# Patient Record
Sex: FEMALE | Race: OTHER | Employment: UNEMPLOYED | URBAN - METROPOLITAN AREA
[De-identification: names, ages, dates, MRNs, and addresses within clinical notes are randomized per-mention and may not be internally consistent; named-entity substitution may affect disease eponyms.]

---

## 2018-05-22 ENCOUNTER — HOSPITAL ENCOUNTER (INPATIENT)
Age: 33
LOS: 1 days | Discharge: HOME | DRG: 419 | End: 2018-05-23
Attending: EMERGENCY MEDICINE | Admitting: SURGERY
Payer: SELF-PAY

## 2018-05-22 ENCOUNTER — APPOINTMENT (OUTPATIENT)
Dept: ULTRASOUND IMAGING | Age: 33
DRG: 419 | End: 2018-05-22
Attending: EMERGENCY MEDICINE
Payer: SELF-PAY

## 2018-05-22 DIAGNOSIS — K81.9 ACALCULOUS CHOLECYSTITIS: Primary | ICD-10-CM

## 2018-05-22 DIAGNOSIS — D72.829 LEUKOCYTOSIS, UNSPECIFIED TYPE: ICD-10-CM

## 2018-05-22 DIAGNOSIS — K81.0 ACUTE CHOLECYSTITIS: ICD-10-CM

## 2018-05-22 LAB
ALBUMIN SERPL-MCNC: 2.7 G/DL (ref 3.5–5)
ALBUMIN/GLOB SERPL: 0.6 {RATIO} (ref 1.2–3.5)
ALP SERPL-CCNC: 206 U/L (ref 50–136)
ALT SERPL-CCNC: 23 U/L (ref 12–65)
ANION GAP SERPL CALC-SCNC: 10 MMOL/L (ref 7–16)
AST SERPL-CCNC: 25 U/L (ref 15–37)
BACTERIA URNS QL MICRO: ABNORMAL /HPF
BASOPHILS # BLD: 0 K/UL (ref 0–0.2)
BASOPHILS NFR BLD: 0 % (ref 0–2)
BILIRUB SERPL-MCNC: 2.5 MG/DL (ref 0.2–1.1)
BUN SERPL-MCNC: 40 MG/DL (ref 6–23)
CALCIUM SERPL-MCNC: 8.8 MG/DL (ref 8.3–10.4)
CASTS URNS QL MICRO: ABNORMAL /LPF
CHLORIDE SERPL-SCNC: 105 MMOL/L (ref 98–107)
CO2 SERPL-SCNC: 24 MMOL/L (ref 21–32)
CREAT SERPL-MCNC: 1.67 MG/DL (ref 0.6–1)
CRYSTALS URNS QL MICRO: 0 /LPF
DIFFERENTIAL METHOD BLD: ABNORMAL
EOSINOPHIL # BLD: 0 K/UL (ref 0–0.8)
EOSINOPHIL NFR BLD: 0 % (ref 0.5–7.8)
EPI CELLS #/AREA URNS HPF: ABNORMAL /HPF
ERYTHROCYTE [DISTWIDTH] IN BLOOD BY AUTOMATED COUNT: 13.9 % (ref 11.9–14.6)
FLUAV AG NPH QL IA: NEGATIVE
FLUBV AG NPH QL IA: NEGATIVE
GLOBULIN SER CALC-MCNC: 4.8 G/DL (ref 2.3–3.5)
GLUCOSE SERPL-MCNC: 106 MG/DL (ref 65–100)
HCG UR QL: NEGATIVE
HCT VFR BLD AUTO: 31.5 % (ref 35.8–46.3)
HGB BLD-MCNC: 11.4 G/DL (ref 11.7–15.4)
IMM GRANULOCYTES # BLD: 0.1 K/UL (ref 0–0.5)
IMM GRANULOCYTES NFR BLD AUTO: 1 % (ref 0–5)
LACTATE BLD-SCNC: 1.5 MMOL/L (ref 0.5–1.9)
LIPASE SERPL-CCNC: 126 U/L (ref 73–393)
LYMPHOCYTES # BLD: 0.9 K/UL (ref 0.5–4.6)
LYMPHOCYTES NFR BLD: 5 % (ref 13–44)
MCH RBC QN AUTO: 31.6 PG (ref 26.1–32.9)
MCHC RBC AUTO-ENTMCNC: 36.2 G/DL (ref 31.4–35)
MCV RBC AUTO: 87.3 FL (ref 79.6–97.8)
MONOCYTES # BLD: 0.9 K/UL (ref 0.1–1.3)
MONOCYTES NFR BLD: 5 % (ref 4–12)
MUCOUS THREADS URNS QL MICRO: 0 /LPF
NEUTS SEG # BLD: 16.4 K/UL (ref 1.7–8.2)
NEUTS SEG NFR BLD: 89 % (ref 43–78)
OTHER OBSERVATIONS,UCOM: ABNORMAL
PLATELET # BLD AUTO: 235 K/UL (ref 150–450)
PMV BLD AUTO: 10.5 FL (ref 10.8–14.1)
POTASSIUM SERPL-SCNC: 3.2 MMOL/L (ref 3.5–5.1)
PROT SERPL-MCNC: 7.5 G/DL (ref 6.3–8.2)
RBC # BLD AUTO: 3.61 M/UL (ref 4.05–5.25)
RBC #/AREA URNS HPF: ABNORMAL /HPF
SODIUM SERPL-SCNC: 139 MMOL/L (ref 136–145)
WBC # BLD AUTO: 18.3 K/UL (ref 4.3–11.1)
WBC URNS QL MICRO: >100 /HPF
YEAST URNS QL MICRO: ABNORMAL

## 2018-05-22 PROCEDURE — 65270000029 HC RM PRIVATE

## 2018-05-22 PROCEDURE — 96375 TX/PRO/DX INJ NEW DRUG ADDON: CPT | Performed by: EMERGENCY MEDICINE

## 2018-05-22 PROCEDURE — 76705 ECHO EXAM OF ABDOMEN: CPT

## 2018-05-22 PROCEDURE — 77030032490 HC SLV COMPR SCD KNE COVD -B

## 2018-05-22 PROCEDURE — 0FT44ZZ RESECTION OF GALLBLADDER, PERCUTANEOUS ENDOSCOPIC APPROACH: ICD-10-PCS | Performed by: SURGERY

## 2018-05-22 PROCEDURE — 80053 COMPREHEN METABOLIC PANEL: CPT | Performed by: EMERGENCY MEDICINE

## 2018-05-22 PROCEDURE — 87804 INFLUENZA ASSAY W/OPTIC: CPT | Performed by: EMERGENCY MEDICINE

## 2018-05-22 PROCEDURE — 93005 ELECTROCARDIOGRAM TRACING: CPT | Performed by: EMERGENCY MEDICINE

## 2018-05-22 PROCEDURE — 81025 URINE PREGNANCY TEST: CPT

## 2018-05-22 PROCEDURE — 83605 ASSAY OF LACTIC ACID: CPT

## 2018-05-22 PROCEDURE — 81015 MICROSCOPIC EXAM OF URINE: CPT | Performed by: EMERGENCY MEDICINE

## 2018-05-22 PROCEDURE — 74011000250 HC RX REV CODE- 250: Performed by: EMERGENCY MEDICINE

## 2018-05-22 PROCEDURE — 96365 THER/PROPH/DIAG IV INF INIT: CPT | Performed by: EMERGENCY MEDICINE

## 2018-05-22 PROCEDURE — 77030027138 HC INCENT SPIROMETER -A

## 2018-05-22 PROCEDURE — 81003 URINALYSIS AUTO W/O SCOPE: CPT | Performed by: EMERGENCY MEDICINE

## 2018-05-22 PROCEDURE — 96361 HYDRATE IV INFUSION ADD-ON: CPT | Performed by: EMERGENCY MEDICINE

## 2018-05-22 PROCEDURE — 74011250636 HC RX REV CODE- 250/636: Performed by: SURGERY

## 2018-05-22 PROCEDURE — 74011250636 HC RX REV CODE- 250/636: Performed by: EMERGENCY MEDICINE

## 2018-05-22 PROCEDURE — 83690 ASSAY OF LIPASE: CPT | Performed by: EMERGENCY MEDICINE

## 2018-05-22 PROCEDURE — 85025 COMPLETE CBC W/AUTO DIFF WBC: CPT | Performed by: EMERGENCY MEDICINE

## 2018-05-22 PROCEDURE — 99285 EMERGENCY DEPT VISIT HI MDM: CPT | Performed by: EMERGENCY MEDICINE

## 2018-05-22 PROCEDURE — 74011000258 HC RX REV CODE- 258: Performed by: EMERGENCY MEDICINE

## 2018-05-22 RX ORDER — SODIUM CHLORIDE 9 MG/ML
125 INJECTION, SOLUTION INTRAVENOUS CONTINUOUS
Status: DISCONTINUED | OUTPATIENT
Start: 2018-05-22 | End: 2018-05-23 | Stop reason: HOSPADM

## 2018-05-22 RX ORDER — HYDROMORPHONE HYDROCHLORIDE 1 MG/ML
1 INJECTION, SOLUTION INTRAMUSCULAR; INTRAVENOUS; SUBCUTANEOUS ONCE
Status: COMPLETED | OUTPATIENT
Start: 2018-05-22 | End: 2018-05-22

## 2018-05-22 RX ORDER — DICLOFENAC POTASSIUM 50 MG/1
50 TABLET, FILM COATED ORAL 3 TIMES DAILY
COMMUNITY

## 2018-05-22 RX ORDER — HYDROMORPHONE HYDROCHLORIDE 1 MG/ML
1 INJECTION, SOLUTION INTRAMUSCULAR; INTRAVENOUS; SUBCUTANEOUS
Status: DISCONTINUED | OUTPATIENT
Start: 2018-05-22 | End: 2018-05-23 | Stop reason: HOSPADM

## 2018-05-22 RX ORDER — SODIUM CHLORIDE 9 MG/ML
150 INJECTION, SOLUTION INTRAVENOUS CONTINUOUS
Status: DISCONTINUED | OUTPATIENT
Start: 2018-05-22 | End: 2018-05-23 | Stop reason: HOSPADM

## 2018-05-22 RX ORDER — ONDANSETRON 2 MG/ML
4 INJECTION INTRAMUSCULAR; INTRAVENOUS
Status: DISCONTINUED | OUTPATIENT
Start: 2018-05-22 | End: 2018-05-23 | Stop reason: HOSPADM

## 2018-05-22 RX ORDER — HYDROMORPHONE HCL IN 0.9% NACL 0.5 MG/ML
1 SYRINGE (ML) INTRAVENOUS
Status: DISCONTINUED | OUTPATIENT
Start: 2018-05-22 | End: 2018-05-22 | Stop reason: SDUPTHER

## 2018-05-22 RX ADMIN — SODIUM CHLORIDE 25 MG: 9 INJECTION INTRAMUSCULAR; INTRAVENOUS; SUBCUTANEOUS at 20:12

## 2018-05-22 RX ADMIN — PIPERACILLIN SODIUM,TAZOBACTAM SODIUM 3.38 G: 3; .375 INJECTION, POWDER, FOR SOLUTION INTRAVENOUS at 21:54

## 2018-05-22 RX ADMIN — HYDROMORPHONE HYDROCHLORIDE 1 MG: 1 INJECTION, SOLUTION INTRAMUSCULAR; INTRAVENOUS; SUBCUTANEOUS at 20:12

## 2018-05-22 RX ADMIN — SODIUM CHLORIDE 1000 ML: 900 INJECTION, SOLUTION INTRAVENOUS at 21:32

## 2018-05-22 RX ADMIN — SODIUM CHLORIDE 1000 ML: 900 INJECTION, SOLUTION INTRAVENOUS at 20:12

## 2018-05-22 RX ADMIN — SODIUM CHLORIDE 125 ML/HR: 900 INJECTION, SOLUTION INTRAVENOUS at 23:32

## 2018-05-22 NOTE — ED TRIAGE NOTES
used with [employer] phone. [id number]. Pt states whole body hurts. Has been hurting since Friday. Pain started in the back, and is now causing hard to breathe since there is pain in the back and the abdomen. Nausea and vomitting since yesterday.

## 2018-05-22 NOTE — ED PROVIDER NOTES
HPI Comments: Patient started having mid back pain about 5 days ago. She also started having chills and a decreased appetite. She denies a sore throat or cough. She states anytime she eats, she vomits. The past couple days she has had some upper abdominal pain as well. She denies any aggravating or relieving factors, denies similar pain in the past.  She has been taking diclofenac for her fever and chills with some slight improvement. Elements of this note were made using speech recognition software. As such, errors of speech recognition may occur. Patient is a 34 y.o. female presenting with general illness. The history is provided by the patient. The history is limited by a language barrier. A  was used. Generalized Body Aches   Associated symptoms include abdominal pain. History reviewed. No pertinent past medical history. History reviewed. No pertinent surgical history. History reviewed. No pertinent family history. Social History     Social History    Marital status:      Spouse name: N/A    Number of children: N/A    Years of education: N/A     Occupational History    Not on file. Social History Main Topics    Smoking status: Never Smoker    Smokeless tobacco: Never Used    Alcohol use No    Drug use: No    Sexual activity: Not on file     Other Topics Concern    Not on file     Social History Narrative    No narrative on file         ALLERGIES: Review of patient's allergies indicates no known allergies. Review of Systems   Constitutional: Positive for chills and fever. Gastrointestinal: Positive for abdominal pain, nausea and vomiting. Negative for constipation and diarrhea. All other systems reviewed and are negative.       Vitals:    05/22/18 1914 05/22/18 1940 05/22/18 1941   BP: (!) 88/56  106/56   Pulse: (!) 115  99   Resp: 18 22   Temp: 99.2 °F (37.3 °C)     SpO2: 97% 98% 98%            Physical Exam   Constitutional: She is oriented to person, place, and time. She appears well-developed and well-nourished. His pain a female who appears her stated age lying on the stretcher who appears uncomfortable   HENT:   Head: Normocephalic and atraumatic. Eyes: Conjunctivae are normal. Pupils are equal, round, and reactive to light. Neck: Normal range of motion. Neck supple. Cardiovascular: Normal rate and regular rhythm. Pulmonary/Chest: Effort normal and breath sounds normal.   Abdominal: Soft. Bowel sounds are normal. There is tenderness. Tenderness to palpation upper abdomen as indicated   Musculoskeletal: She exhibits no edema or tenderness. Neurological: She is alert and oriented to person, place, and time. Skin: Skin is warm and dry. Psychiatric: She has a normal mood and affect. Her behavior is normal.   Nursing note and vitals reviewed. MDM  Number of Diagnoses or Management Options  Acalculous cholecystitis: new and requires workup  Leukocytosis, unspecified type:   Diagnosis management comments: 8:03 PM concerned about cholecystitis, will get ultrasound  9:09 PM US shows acute cholecystitis.   Discussed results with patient, General surgery has been paged  9:32 PM spoke with Dr. Joseph Mcclellan, once patient placed on Zosyn for her UTI       Amount and/or Complexity of Data Reviewed  Clinical lab tests: ordered and reviewed  Tests in the radiology section of CPT®: ordered and reviewed  Discuss the patient with other providers: yes  Independent visualization of images, tracings, or specimens: yes    Risk of Complications, Morbidity, and/or Mortality  Presenting problems: moderate  Diagnostic procedures: moderate  Management options: moderate          ED Course       Procedures

## 2018-05-22 NOTE — IP AVS SNAPSHOT
[address] 
[phone] Patient: Ovi Bedoya MRN: [id number] MJN:6/[id number] About your hospitalization You were admitted on:  May 22, 2018 You last received care in the:  [facility] 2 SURGICAL You were discharged on:  May 23, 2018 Why you were hospitalized Your primary diagnosis was:  Not on File Your diagnoses also included:  Acute Cholecystitis Follow-up Information Follow up With Details Comments Contact Info None   None (395) Patient stated that they have no PCP Chinmay Fountain MD On 0/7/5564 Appt with NP at 9:15AM Vicki Maxwell [address] [place] [id number] 
[phone] Your Scheduled Appointments Thursday June 07, 2018  9:15 AM EDT Global Post Op with JENNIFER Young  
[place] [facility]  MAIN ([facility] MAIN) [address] [place] [address] [facility]  
[phone] Discharge Orders None A check nestor indicates which time of day the medication should be taken. My Medications START taking these medications Instructions Each Dose to Equal  
 Morning Noon Evening Bedtime HYDROcodone-acetaminophen 7.5-325 mg per tablet Commonly known as:  Jolly Irwin Your next dose is: Take on as needed schedule Take 1 Tab by mouth every six (6) hours as needed for Pain. Max Daily Amount: 4 Tabs. 1 Tab  
    
   
   
   
  
 promethazine 25 mg tablet Commonly known as:  PHENERGAN Your next dose is: Take on as needed schedule Take 1 Tab by mouth every six (6) hours as needed for Nausea. 25 mg CONTINUE taking these medications Instructions Each Dose to Equal  
 Morning Noon Evening Bedtime  
 diclofenac potassium 50 mg tablet Commonly known as:  CATAFLAM  
Your next dose is:  TODAY, afternoon and bedtime Take 50 mg by mouth three (3) times daily.   
 50 mg  
    
 Where to Get Your Medications Information on where to get these meds will be given to you by the nurse or doctor. ! Ask your nurse or doctor about these medications HYDROcodone-acetaminophen 7.5-325 mg per tablet  
 promethazine 25 mg tablet Opioid Education Prescription Opioids: What You Need to Know: 
 
 
1. May shower. No tub baths. 2. Diet: as tolerated. 3. No driving while taking pain medication. 4. No lifting over 10 pounds. Colecistectomía: Jesica Whitfield en el [facility] - [ Cholecystectomy: What to Expect at [facility] ] Ruiz recuperación Después de la [facility], es normal sentirse débil y fatigado por varios días después de regresar al [facility]. Es posible que tenga el abdomen hinchado. Si le willson hecho jonatan cirugía laparoscópica, también podría sentir dolor en el hombro radha unas 24 horas. Es posible que tenga gases o necesite eructar mucho al principio, y a [address]. La diarrea suele desaparecer en el transcurso de 2 a 4 semanas, jimmy podría durar más. El tiempo que tarde en recuperarse depende de si le willson hecho jonatan cirugía laparoscópica o abierta. · En el georges de [facility] laparoscópica, la mayoría de las personas pueden volver a trabajar o hacer ruiz rutina habitual en 1 a 2 semanas, jimmy podría tardar más, según el tipo de trabajo que aleah. · En el georges de [facility], es probable que tarde de 4 a 6 semanas en poder volver a ruiz rutina habitual. 
Esta hoja de [employer] idea general del tiempo que le llevará recuperarse. No obstante, cada persona se recupera a un ritmo diferente. Siga los pasos que se mencionan a continuación para recuperarse lo más rápido posible. Cómo puede cuidarse en el hogar? Actividad ? · Descanse cuando se sienta fatigado. Dormir lo suficiente le ayudará a recuperarse. ? · Intente caminar todos los días. Comience caminando un poco más de lo que caminó el día anterior. Aumente en forma gradual la distancia que camina. Caminar aumenta el flujo de [place] y [place] a prevenir la neumonía y el estreñimiento. ? · Evite levantar cualquier cosa que implique un esfuerzo radha unas 2 a 4 semanas. [place] podría incluir un angélica, bolsas de las compras y envases de [place] pesados, mochilas o maletines pesados, bolsas de arena para excremento de logan o alimentos para perros, o jonatan aspiradora. ? · Evite actividades vigorosas, jerry montar en bicicleta, trotar, levantar pesas y hacer ejercicio aeróbico, hasta que zhoa médico lo apruebe. ? · Puede ducharse entre 24 y 50 horas después de la [place], si zhao médico lo [place]. Seque el ken (incisión) con toques suaves de toalla. No tome malia de inmersión radha las primeras 2 semanas o hasta que zhao médico lo apruebe. ? · Puede conducir cuando ya no esté tomando analgésicos (medicamentos para el dolor) y pueda desplazar con rapidez zhao pie del acelerador al freno. También debe estar en condiciones de poder permanecer sentado con comodidad radha un tiempo alexx, aun si no piensa ir muy lejos. Janelle Lade atascado en el tráfico.  
? · Para la cirugía laparoscópica, la mayoría de las personas pueden volver a trabajar o hacer zhao rutina normal en 1 a 2 semanas, aunque podrían [employer]. En el georges de [place], es probable que tarde de 4 a 6 semanas en poder volver a zhao rutina habitual.  
? · Zhao médico le indicará cuándo puede volver a tener relaciones sexuales. ?Dieta ?  · Coma cantidades más pequeñas varias veces al día en lugar de pocas veces y en grandes cantidades. Puede seguir jonatan dieta normal, jimmy evite los alimentos grasosos por 1 mes aproximadamente. Entre los alimentos grasosos se [employer], la [place], [place] queso y muchos aperitivos. Si tiene [employer], coma alimentos suaves bajos en grasa, jerry arroz sin condimentar, gavino a la she, pan francisca y yogur. ? · Marcy abundantes líquidos (a menos que zhao médico le indique lo contrario). ? · Si tiene diarrea, evite los alimentos condimentados, los productos lácteos, los alimentos grasosos y el alcohol. También puede observar si la causa es algún alimento en particular y dejar de comerlo. Si la diarrea [employer] de 2 semanas, hable con zhao médico.  
? · Podría notar que no evacua el intestino con regularidad steffi después de la [place]. [place] es común. Trate de evitar el estreñimiento y de no hacer esfuerzos cuando evacua el intestino. Sería conveniente yves un suplemento de GetBack. Si no ha evacuado el intestino después de un par de días, pregúntele a zhao médico si puede yves un laxante suave. Medicamentos ? · Zhao médico le dirá si puede volver a yves delia medicamentos y cuándo puede volver a hacerlo. También le dará indicaciones sobre cualquier medicamento nuevo que deba yves usted. ? · Si phi medicamentos que previenen la formación de coágulos de [place], jerry warfarina (Coumadin), clopidogrel (Plavix) o aspirina, asegúrese de hablar con zhao médico. Él o pam le dirá si debe volver a yves estos medicamentos y en qué momento. Asegúrese de que entiende exactamente lo que el médico quiere que aleah. ? · [employer] analgésicos exactamente [employer]	[place] fueron indicados. ¨ Si el médico le recetó analgésicos, tómelos según las indicaciones. ¨ Si no está tomando analgésicos recetados, tome neil de [address], prema jerry acetaminofén (Tylenol), ibuprofeno (Advil, Motrin) o naproxeno (Aleve). Shikha y siga todas las instrucciones de la [place]. ¨ No tome dos o más analgésicos al [employer], a menos que el médico se lo haya indicado. Muchos analgésicos contienen acetaminofén, es decir, Tylenol. El exceso de Tylenol puede ser dañino. ? · Si le parece que el analgésico le está produciendo revoltura del estómago: [address] comidas (a menos que zhao médico le indique lo contrario). ¨ Pídale al médico un analgésico diferente. ? · Si zhoa médico le recetó antibióticos, tómelos según las indicaciones. No deje de tomarlos por el hecho de sentirse mejor. Debe yves todos los antibióticos hasta terminarlos. Cuidado de la incisión ? · Si le willson puesto tiras de cinta [employer] incisión, o ken, déjeselas puestas radha jonatan semana o hasta que se caigan por sí solas. ? · Después de 24 a 48 horas, lave la jason a diario con [place] tibia y séquela con toques suaves de toalla. ? · Es posible que tenga grapas para mantener el ken cerrado. Manténgalas secas hasta que zhao [address] [facility] [address]. [place] es por lo general en 7 a 10 días. ? · Mantenga la jason limpia y [place]. Puede cubrirla con jonatan venda de gasa si supura o roza contra la ropa. Cambie la venda todos los cristina. ? [facility] ? · Para reducir la hinchazón y el dolor, colóquese hielo o jonatan compresa fría sobre el abdomen radha 10 a 20 minutos cada vez. Shandra esto cada 1 a 2 horas. Póngase un paño kwon entre el hielo y la piel. La atención de seguimiento es jonatan parte clave de zhao tratamiento y seguridad. Asegúrese de hacer y acudir a todas las citas, y llame a zhao médico si está teniendo problemas. También es jonatan buena idea saber los resultados de los exámenes y mantener jonatan lista de los medicamentos que phi. Cuándo debe pedir ayuda? Llame al 911 en cualquier momento que considere que necesita atención de [place]. Por ejemplo, llame si: 
? · Se desmayó (perdió el conocimiento). ? · [address] dificultades para respirar. ? · Tiene dolor repentino en el pecho y falta de [place], o tose batsheva. ?Llame a zhao médico ahora mismo o busque atención médica inmediata si: 
? · Siente el estómago revuelto y no puede beber líquidos. ? · Tiene dolor que no mejora después de yves analgésicos. ? · [address], tales jerry: ¨ Aumento del dolor, la hinchazón, el enrojecimiento o la temperatura. ¨ Vetas rojizas que salen de la incisión. ¨ Pus que supura de la incisión. ¨ Ganglios linfáticos inflamados en el scout, las axilas o la sindhu. Gavino Hennessy. ? · La orina es de color amarronado oscuro o las heces son de color denisse o del color de la arcilla. ? · La piel o la parte chloé de los ojos se le ponen amarillentas. ? · La venda colocada sobre la incisión se empapa con batsheva de color freeman vivo. ? · Tiene señales de un coágulo de batsheva, tales jerry: ¨ Dolor en la pantorrilla, el muslo, la sindhu o detrás de la rodilla. ¨ Enrojecimiento e hinchazón en la pierna o la sindhu. ? · Tiene problemas para orinar o evacuar el intestino, en especial si tiene dolor leve o hinchazón en la parte baja del abdomen. ?Preste especial atención a cualquier cambio en zhao kennedi y asegúrese de comunicarse con zhao médico si: 
? · Le willson hecho jonatan cirugía laparoscópica y el dolor en el hombro dura más de 24 horas. ? · No evacua el intestino después de yves un laxante. Dónde puede encontrar más información en inglés? Alo mcduffie [email or web address] Elana BUENO en la búsqueda para aprender más acerca de \"Colecistectomía: Dany Grajeda en el hogar - [ Cholecystectomy: What to Expect at [facility] ]. \" 
Revisado: 12 ray, 2017 Versión del contenido: 11.4 © 3717-6012 Healthwise, [employer]. Las instrucciones de cuidado fueron adaptadas bajo licencia por [employer] (which disclaims liability or warranty for this information).  Si usted tiene preguntas sobre jonatan afección médica o sobre estas instrucciones, siempre pregunte a zhao profesional de kennedi. The Daily HundredGael niega toda garantía o responsabilidad por zhao uso de esta información. DISCHARGE SUMMARY from Nurse PATIENT INSTRUCTIONS: 
 
 
F-face looks uneven A-arms unable to move or move unevenly S-speech slurred or non-existent T-time-call 911 as soon as signs and symptoms begin-DO NOT go Back to bed or wait to see if you get better-TIME IS BRAIN. Warning Signs of HEART ATTACK Call 911 if you have these symptoms: 
? Chest discomfort. Most heart attacks involve discomfort in the center of the chest that lasts more than a few minutes, or that goes away and comes back. It can feel like uncomfortable pressure, squeezing, fullness, or pain. ? Discomfort in other areas of the upper body. Symptoms can include pain or discomfort in one or both arms, the back, neck, jaw, or stomach. ? Shortness of breath with or without chest discomfort. ? Other signs may include breaking out in a cold sweat, nausea, or lightheadedness. Don't wait more than five minutes to call [address]! Fast action can save your life. Calling 911 is almost always the fastest way to get lifesaving treatment. Emergency Medical Services staff can begin treatment when they arrive  up to an hour sooner than if someone gets to the hospital by car. The discharge information has been reviewed with the patient. The patient verbalized understanding. Discharge medications reviewed with the patient and appropriate educational materials and side effects teaching were provided. ___________________________________________________________________________________________________________________________________ Introducing [facility] & HEALTH SERVICES! [facility] introduce portal paciente MyChart . Ahora se puede acceder a partes de zhao expediente médico, enviar por correo electrónico la oficina de zhao médico y solicitar renovaciones de medicamentos en línea. En zhao navegador de Internet , Gerre Joaquin a [email or web address] Aleah clic en el usuario por John Paul Huggins? Phil Daub clic aquí en la sesión Brooks Mcadams. Verá la página de registro [employer]. Ingrese zhao código de [facility] prema y jerry aparece a continuación. Usted no tendrá que [employer] código después de ceasar completado el proceso de registro . Si usted no se inscribe antes de la fecha de caducidad , debe solicitar un nuevo código. · MyChart Código de acceso : [id number] Expires: 8/20/2018  7:01 PM 
 
Ingresa los últimos cuatro dígitos de zhao Número de Seguro Social ( xxxx ) y fecha de nacimiento ( dd / mm / aaaa ) jerry se indica y aleah clic en Enviar. Usted será llevado a la siguiente página de registro . Crear un ID MyChart . Esta será zhao ID de inicio de sesión de MyChart y no puede ser [place] , por lo que pensar en jonatan que es [place] y fácil de recordar . Crear jonatan contraseña MyChart . Usted puede cambiar zhao contraseña en cualquier momento . Ingrese zhao Password Reset de preguntas y [place] . [place] se puede utilizar en un momento posterior si usted olvida zhao contraseña. Introduzca zhao dirección de correo electrónico . [facility] recibirá jonatan notificación por correo electrónico cuando la nueva información está disponible en MyChart . Diane Catching clic en Registrarse. Leata Feathers kamlesh y descargar porciones de zhao expediente médico. 
Aleah clic en el enlace de descarga del menú Resumen para descargar jonatan copia portátil de zhao información médica . Si tiene [employer] , por favor visite la sección de preguntas frecuentes del sitio web MyChart . Recuerde, MyChart NO es que se utilizará para las necesidades urgentes. Para emergencias médicas , llame al 911 . Hoangora disponible en zhao iPhone y Android ! Introducing Natalio Black As a [facility] patient, I wanted to make you aware of our electronic visit tool called Natalio Black. Embedster allows you to connect within minutes with a medical provider 24 hours a day, seven days a week via a mobile device or tablet or logging into a secure website from your computer. You can access Natalio Black from anywhere in the [place]. A virtual visit might be right for you when you have a simple condition and feel like you just dont want to get out of bed, or cant get away from work for an appointment, when your regular [facility] provider is not available (evenings, weekends or holidays), or when youre out of town and need minor care. Electronic visits cost only $49 and if the Open mHealth/TestObject provider determines a prescription is needed to treat your condition, one can be electronically transmitted to a nearby pharmacy*. Please take a moment to enroll today if you have not already done so. The enrollment process is free and takes just a few minutes. To enroll, please download the Open mHealth/TestObject rosmery to your tablet or phone, or visit [email or web address] to enroll on your computer. And, as an [address] [place] [address] patient with a [employer] account, the results of your visits will be scanned into your electronic medical record and your primary care provider will be able to view the scanned results. We urge you to continue to see your regular [facility] provider for your ongoing medical care. And while your primary care provider may not be the one available when you seek a Natalio Black virtual visit, the peace of mind you get from getting a real diagnosis real time can be priceless. For more information on Natalio Black, view our Frequently Asked Questions (FAQs) at [email or web address]. Sincerely, 
 
Hunter Abdalla MD 
Chief Medical Officer Micaela Osborne *:  certain medications cannot be prescribed via Natalio Black Providers Seen During Your Hospitalization Provider Specialty Primary office phone Tania Johns MD Emergency Medicine [phone] Sukhwinder Escalante MD General Surgery [phone] Your Primary Care Physician (PCP) Primary Care Physician Office Phone Office Fax NONE ** None ** ** None ** You are allergic to the following No active allergies Recent Documentation Height Weight BMI Smoking Status 1.6 m 71.7 kg 27.99 kg/m2 Never Smoker Emergency Contacts Name Discharge Info Relation Home Work Mobile Alex Yun  Spouse [3] [phone] Patient Belongings The following personal items are in your possession at time of discharge: 
  Dental Appliances: None  Visual Aid: None          Jewelry: Ring  Clothing: At bedside, Footwear, Pants, Shirt, Socks    Other Valuables: Jono [address] [place] [address] Claudia, DODIE Melo Please provide this summary of care documentation to your next provider. Signatures-by signing, you are acknowledging that this After Visit Summary has been reviewed with you and you have received a copy. Patient Signature:  ____________________________________________________________ Date:  ____________________________________________________________  
  
[facility] Provider Signature:  ____________________________________________________________ Date:  ____________________________________________________________  
  
  
   
  
Clarissa Beckwith [address] [facility] 
[phone] Patient: Salazar Delcid MRN: [id number] TZO:[birth year] Sobre ruiz hospitalización Le admitieron el:  May 22, 2018 Ruiz tratamiento más reciente fue el:  [facility] SURGICAL Le dieron de zach el:  May 23, 2018 Por qué le ingresaron Ruiz diagnosis primaria es:  No está archivado/a Ruiz diagnosis también incluye:  Acute Cholecystitis Follow-up Information Follow up With Details Comments Contact Info None   None (395) Patient stated that they have no PCP Marek Fernandez MD On 3/9/9982 Appt with NP at 9:15AM [address] [place] [address] Dr Domingo [facility] [place] [address] 
[phone] Your Scheduled Appointments Thursday June 07, 2018  9:15 AM EDT Global Post Op with JENNIFER Self  
[place] SURGICAL - MAIN ([facility] MAIN) [address] [facility]  
[phone] Discharge Orders [employer] A check nestor indicates which time of day the medication should be taken. My Medications EMPIECE a yves HyperWeek Instructions Each Dose to Equal  
 Morning Noon Evening Bedtime HYDROcodone-acetaminophen 7.5-325 mg per tablet También conocido jerry:  Lavonne Grjuancho Your next dose is: Take on as needed schedule Take 1 Tab by mouth every six (6) hours as needed for Pain. Max Daily Amount: 4 Tabs. 1 Tab  
    
   
   
   
  
 promethazine 25 mg tablet También conocido jerry: PHENERGAN Your next dose is: Take on as needed schedule Take 1 Tab by mouth every six (6) hours as needed for Nausea. 25 mg  
    
   
   
   
  
  
SIGA tomando estos medicamentos Instructions Each Dose to Equal  
 Morning Noon Evening Bedtime  
 diclofenac potassium 50 mg tablet También conocido jerry:  CATAFLAM  
Your next dose is:  TODAY, afternoon and bedtime Take 50 mg by mouth three (3) times daily. 50 mg  
    
  
   
   
  
   
  
  
  
  
Dónde puede recoger delia medicamentos Information on where to get these meds will be given to you by the nurse or doctor. ! Pregunte a ruiz médico o enfermero/a sobre HyperWeek HYDROcodone-acetaminophen 7.5-325 mg per tablet  
 promethazine 25 mg tablet Opioid Education Prescription Opioids: What You Need to Know: 
 
 
1. May shower. No tub baths. 2. Diet: as tolerated. 3. No driving while taking pain medication. 4. No lifting over 10 pounds. Colecistectomía: Exie [place] en el hogar - [ Cholecystectomy: What to Expect at [facility] ] Zhao recuperación Después de la [place], es normal sentirse débil y fatigado por varios días después de regresar al hogar. Es posible que tenga el abdomen hinchado. Si le willson hecho jonatan cirugía laparoscópica, también podría sentir dolor en el hombro radha unas 24 horas. Es posible que tenga gases o necesite eructar mucho al principio, y a [address]. La diarrea suele desaparecer en el transcurso de 2 a 4 semanas, jimmy podría durar más. El tiempo que tarde en recuperarse depende de si le willson hecho jonatan cirugía laparoscópica o abierta. · En el georges de [facility] laparoscópica, la mayoría de las personas pueden volver a trabajar o hacer zhao rutina habitual en 1 a 2 semanas, jimmy podría tardar más, según el tipo de trabajo que aleah. · En el georges de [facility], es probable que tarde de 4 a 6 semanas en poder volver a zhao rutina habitual. 
Esta hoja de [employer] idea general del tiempo que le llevará recuperarse. No obstante, cada persona se recupera a un ritmo diferente. Siga los pasos que se mencionan a continuación para recuperarse lo más rápido posible. Cómo puede cuidarse en el hogar? Actividad ? · Descanse cuando se sienta fatigado. Dormir lo suficiente le ayudará a recuperarse. ? · Intente caminar todos los días. Comience caminando un poco más de lo que caminó el día anterior.  Aumente en forma gradual la distancia que camina. Caminar aumenta el flujo de [place] a prevenir la neumonía y el estreñimiento. ? · Evite levantar cualquier cosa que implique un esfuerzo radha unas 2 a 4 semanas. [place] podría incluir un angélica, bolsas de las compras y envases de [place] pesados, mochilas o maletines pesados, bolsas de arena para excremento de logan o alimentos para perros, o jonatan aspiradora. ? · Evite actividades vigorosas, jerry montar en bicicleta, trotar, levantar pesas y hacer ejercicio aeróbico, hasta que zhao médico lo apruebe. ? · Puede ducharse entre 24 y 50 horas después de la [place], si zhao médico lo [place]. Seque el ken (incisión) con toariana [place] de toalla. No tome malia de inmersión radha las primeras 2 semanas o hasta que zhao médico lo apruebe. ? · Puede conducir cuando ya no esté tomando analgésicos (medicamentos para el dolor) y pueda desplazar con rapidez zhao pie del acelerador al freno. También debe estar en condiciones de poder permanecer sentado con comodidad radha un tiempo alexx, aun si no piensa ir muy lejos. Doyal Oiler atascado en el tráfico.  
? · Para la cirugía laparoscópica, la mayoría de las personas pueden volver a trabajar o hacer zhao rutina normal en 1 a 2 semanas, aunque podrían [employer]. En el georges de [place], es probable que tarde de 4 a 6 semanas en poder volver a zhao rutina habitual.  
? · Zhao médico le indicará cuándo puede volver a tener relaciones sexuales. ?Dieta ? · Coma cantidades más pequeñas varias veces al día en lugar de pocas veces y en grandes cantidades. Puede seguir jonatan dieta normal, jimmy evite los alimentos grasosos por 1 mes aproximadamente. Entre los alimentos grasosos se [employer], la [place], [place] queso y muchos aperitivos. Si tiene [employer], coma alimentos suaves bajos en grasa, jerry arroz sin condimentar, gavino a la she, pan francisca y yogur.   
? · Marcy abundantes líquidos (a menos que zhao médico le indique lo contrario). ? · Si tiene diarrea, evite los alimentos condimentados, los productos lácteos, los alimentos grasosos y el alcohol. También puede observar si la causa es algún alimento en particular y dejar de comerlo. Si la diarrea [employer] de 2 semanas, hable con zhao médico.  
? · Podría notar que no evacua el intestino con regularidad steffi después de la [facility]. [place] es común. Trate de evitar el estreñimiento y de no hacer esfuerzos cuando evacua el intestino. Sería conveniente yves un suplemento de [place]. Si no ha evacuado el intestino después de un par de días, pregúntele a zhao médico si puede yves un laxante suave. Medicamentos ? · Zhao médico le dirá si puede volver a yves delia medicamentos y cuándo puede volver a hacerlo. También le dará indicaciones sobre cualquier medicamento nuevo que deba yves usted. ? · Si phi medicamentos que previenen la formación de coágulos de [place], jerry warfarina (Coumadin), clopidogrel (Plavix) o aspirina, asegúrese de hablar con zhao médico. Él o pam le dirá si debe volver a yves estos medicamentos y en qué momento. Asegúrese de que entiende exactamente lo que el médico quiere que aleah. ? · [employer] analgésicos exactamente [facility] fueron indicados. ¨ Si el médico le recetó analgésicos, tómelos según las indicaciones. ¨ Si no está tomando analgésicos recetados, tome neil de [employer], prema jerry acetaminofén (Tylenol), ibuprofeno (Advil, Motrin) o naproxeno (Aleve). Shihka y siga todas las instrucciones de la [place]. ¨ No tome dos o más analgésicos al [employer], a menos que el médico se lo haya indicado. Muchos analgésicos contienen acetaminofén, es decir, Tylenol. El exceso de Tylenol puede ser dañino. ? · Si le parece que el analgésico le está produciendo revoltura del estómago: [address] comidas (a menos que zhao médico le indique lo contrario). ¨ Pídale al médico un analgésico diferente. ? · Si zhao médico le recetó antibióticos, tómelos según las indicaciones. No deje de tomarlos por el hecho de sentirse mejor. Debe yves todos los antibióticos hasta terminarlos. Cuidado de la incisión ? · Si le willson puesto tiras de cinta [employer] incisión, o ken, déjeselas puestas radha jonatan semana o hasta que se caigan por sí solas. ? · Después de 24 a 48 horas, lave la jason a diario con [place] tibia y séquela con toques suaves de toalla. ? · Es posible que tenga grapas para mantener el ken cerrado. Manténgalas secas hasta que zhao [address] [facility] [address]. Obion es por lo general en 7 a 10 días. ? · Mantenga la jason limpia y [place]. Puede cubrirla con jonatan venda de gasa si supura o roza contra la ropa. Cambie la venda todos los cristina. ? Doy [place] ? · Para reducir la hinchazón y el dolor, colóquese hielo o jonatan compresa fría sobre el abdomen radha 10 a 20 minutos cada vez. Shandra esto cada 1 a 2 horas. Póngase un paño kwon entre el hielo y la piel. La atención de seguimiento es jonatan parte clave de zhao tratamiento y seguridad. Asegúrese de hacer y acudir a todas las citas, y llame a zhao médico si está teniendo problemas. También es jonatan buena idea saber los resultados de los exámenes y mantener jonatan lista de los medicamentos que phi. Cuándo debe pedir ayuda? Llame al 911 en cualquier momento que considere que necesita atención de [place]. Por ejemplo, llame si: 
? · Se desmayó (perdió el conocimiento). ? · [address] dificultades para respirar. ? · Tiene dolor repentino en el pecho y falta de [place], o tose batsheva. ?Llame a zhao médico ahora mismo o busque atención médica inmediata si: 
? · Siente el estómago revuelto y no puede beber líquidos. ? · Tiene dolor que no mejora después de yves analgésicos. ? · [address], tales jerry: ¨ Aumento del dolor, la hinchazón, el enrojecimiento o la temperatura. ¨ Vetas rojizas que salen de la incisión. ¨ Pus que supura de la incisión. ¨ Ganglios linfáticos inflamados en el scout, las axilas o la sindhu. Terry Dolphin. ? · La orina es de color amarronado oscuro o las heces son de color denisse o del color de la arcilla. ? · La piel o la parte chloé de los ojos se le ponen amarillentas. ? · La venda colocada sobre la incisión se empapa con batsheva de color freeman vivo. ? · Tiene señales de un coágulo de batsheva, tales jerry: ¨ Dolor en la pantorrilla, el muslo, la sindhu o detrás de la rodilla. ¨ Enrojecimiento e hinchazón en la pierna o la sindhu. ? · Tiene problemas para orinar o evacuar el intestino, en especial si tiene dolor leve o hinchazón en la parte baja del abdomen. ?Preste especial atención a cualquier cambio en zhao kennedi y asegúrese de comunicarse con zhao médico si: 
? · Le willson hecho jonatan cirugía laparoscópica y el dolor en el hombro dura más de 24 horas. ? · No evacua el intestino después de yves un laxante. Dónde puede encontrar más información en inglés? [place] a [email or web address] Charmaine BUENO en la búsqueda para aprender más acerca de \"Colecistectomía: Jj Graham en el [facility] - [ Cholecystectomy: What to Expect at [facility] ]. \" 
Revisado: [address], 2017 Versión del contenido: 11.4 © 4975-0952 Healthwise, Incorporated. Las instrucciones de cuidado fueron adaptadas bajo licencia por [employer] (which disclaims liability or warranty for this information). Si usted tiene Vernalis Fonda afección médica o sobre estas instrucciones, siempre pregunte a zhao profesional de kennedi. Healthwise, Incorporated niega toda garantía o responsabilidad por zhao uso de esta información. DISCHARGE SUMMARY from Nurse PATIENT INSTRUCTIONS: 
 
 
F-face looks uneven A-arms unable to move or move unevenly S-speech slurred or non-existent T-time-call 911 as soon as signs and symptoms begin-DO NOT go Back to bed or wait to see if you get better-TIME IS BRAIN. Warning Signs of HEART ATTACK Call 911 if you have these symptoms: 
? Chest discomfort. Most heart attacks involve discomfort in the center of the chest that lasts more than a few minutes, or that goes away and comes back. It can feel like uncomfortable pressure, squeezing, fullness, or pain. ? Discomfort in other areas of the upper body. Symptoms can include pain or discomfort in one or both arms, the back, neck, jaw, or stomach. ? Shortness of breath with or without chest discomfort. ? Other signs may include breaking out in a cold sweat, nausea, or lightheadedness. Don't wait more than five minutes to call [address]! Fast action can save your life. Calling 911 is almost always the fastest way to get lifesaving treatment. Emergency Medical Services staff can begin treatment when they arrive  up to an hour sooner than if someone gets to the hospital by car. The discharge information has been reviewed with the patient. The patient verbalized understanding. Discharge medications reviewed with the patient and appropriate educational materials and side effects teaching were provided. ___________________________________________________________________________________________________________________________________ Introducing [facility]! [facility] introduce portal paciente [employer] . Ahora se puede acceder a partes de zhao expediente médico, enviar por correo electrónico la oficina de zhao médico y solicitar renovaciones de medicamentos en línea. En zhao navegador de Internet , Vin Soto a [email or web address] Shandra prem en el usuario por Jose Genre? Elgie Quincy clic aquí en la sesión Laverda Brandan. Verá la página de registro [place]. Ingrese zhao código de [facility] prema y jerry aparece a continuación. Usted no tendrá que [employer] código después de caesar completado el proceso de registro . Si usted no se inscribe antes de la fecha de caducidad , debe solicitar un nuevo código. · MyChart Código de acceso : [id number] Expires: 8/20/2018  7:01 PM 
 
Ingresa los últimos cuatro dígitos de zhao Número de Seguro Social ( xxxx ) y fecha de nacimiento ( dd / mm / aaaa ) jerry se indica y shandra clic en Enviar. Usted será llevado a la siguiente página de registro . Crear un ID MyChart . Esta será zhao ID de inicio de sesión de MyChart y no puede ser [place] , por lo que pensar en jonatan que es Pearletha Senters y fácil de recordar . Crear jonatan contraseña MyChart . Usted puede cambiar zhao contraseña en cualquier momento . Ingrese zhao Password Reset de preguntas y [place] . [place] se puede utilizar en un momento posterior si usted olvida zhao contraseña. Introduzca zhao dirección de correo electrónico . [place] recibirá jonatan notificación por correo electrónico cuando la nueva información está disponible en MyChart . Amber amaro en Registrarse. [place] kamlesh y descargar porciones de zhao expediente médico. 
Shandra clic en el enlace de descarga del menú Resumen para descargar jonatan copia portátil de zhao información médica . Si tiene [employer] , por favor visite la sección de preguntas frecuentes del sitio web MyChart . Recuerde, MyChart NO es que se utilizará para las necesidades urgentes. Para emergencias médicas , llame al 911 . Ahora disponible en zhao iPhone y Android ! Introducing Natalio Black As a [facility] patient, I wanted to make you aware of our electronic visit tool called Natalio Black. [facility] 24/7 allows you to connect within minutes with a medical provider 24 hours a day, seven days a week via a mobile device or tablet or logging into a secure website from your computer. You can access Natalio [employer]mehreenfin from anywhere in the [place]. A virtual visit might be right for you when you have a simple condition and feel like you just dont want to get out of bed, or cant get away from work for an appointment, when your regular [facility] provider is not available (evenings, weekends or holidays), or when youre out of town and need minor care. Electronic visits cost only $49 and if the [employer] provider determines a prescription is needed to treat your condition, one can be electronically transmitted to a nearby pharmacy*. Please take a moment to enroll today if you have not already done so. The enrollment process is free and takes just a few minutes. To enroll, please download the [employer] rosmery to your tablet or phone, or visit [email or web address] to enroll on your computer. And, as an [address] [place] [address] patient with a [employer] account, the results of your visits will be scanned into your electronic medical record and your primary care provider will be able to view the scanned results. We urge you to continue to see your regular [facility] provider for your ongoing medical care. And while your primary care provider may not be the one available when you seek a Natalio Tanojoycelyn virtual visit, the peace of mind you get from getting a real diagnosis real time can be priceless. For more information on Natalio Black, view our Frequently Asked Questions (FAQs) at [email or web address]. Sincerely, 
 
Anita Rodriguez MD 
Chief Medical Officer [address] [place] [address] *:  certain medications cannot be prescribed via Natalio Black Providers Seen During Your Hospitalization Personal Médico [facility]special[facility] Teléfono principal de la oficina Hamlet Cohen MD Emergency Medicine [phone] Latanya Means MD General Surgery [phone] Ruiz médico de atención primaria (PCP ) Primary Care Physician Office Phone Office Fax  NONE ** None ** ** None **  
  
 Brent alergias a lo siguiente No brent alergias Documentación recientes Height Weight BMI (IMC) Preeti pitts 1.6 m 71.7 kg 27.99 kg/m2 Never Smoker Emergency Contacts Name Discharge Info Relation Home Work Mobile Alex Yun  Spouse [3] [phone] Patient Belongings The following personal items are in your possession at time of discharge: 
  Dental Appliances: None  Visual Aid: None          Jewelry: Ring  Clothing: At bedside, Footwear, Pants, Shirt, Socks    Other Valuables: Jono, [address] [place] [address] Claudia, DODIE Melo Please provide this summary of care documentation to your next provider. Signatures-by signing, you are acknowledging that this After Visit Summary has been reviewed with you and you have received a copy. Patient Signature:  ____________________________________________________________ Date:  ____________________________________________________________  
  
[facility] Provider Signature:  ____________________________________________________________ Date:  ____________________________________________________________

## 2018-05-23 ENCOUNTER — ANESTHESIA (OUTPATIENT)
Dept: SURGERY | Age: 33
DRG: 419 | End: 2018-05-23
Payer: SELF-PAY

## 2018-05-23 ENCOUNTER — ANESTHESIA EVENT (OUTPATIENT)
Dept: SURGERY | Age: 33
DRG: 419 | End: 2018-05-23
Payer: SELF-PAY

## 2018-05-23 VITALS
TEMPERATURE: 98.2 F | RESPIRATION RATE: 16 BRPM | HEART RATE: 80 BPM | SYSTOLIC BLOOD PRESSURE: 96 MMHG | OXYGEN SATURATION: 96 % | DIASTOLIC BLOOD PRESSURE: 56 MMHG | BODY MASS INDEX: 28 KG/M2 | WEIGHT: 158 LBS | HEIGHT: 63 IN

## 2018-05-23 LAB
ATRIAL RATE: 108 BPM
CALCULATED P AXIS, ECG09: 45 DEGREES
CALCULATED R AXIS, ECG10: 57 DEGREES
CALCULATED T AXIS, ECG11: 23 DEGREES
DIAGNOSIS, 93000: NORMAL
P-R INTERVAL, ECG05: 152 MS
Q-T INTERVAL, ECG07: 334 MS
QRS DURATION, ECG06: 64 MS
QTC CALCULATION (BEZET), ECG08: 447 MS
VENTRICULAR RATE, ECG03: 108 BPM

## 2018-05-23 PROCEDURE — 77030018836 HC SOL IRR NACL ICUM -A: Performed by: SURGERY

## 2018-05-23 PROCEDURE — 76010000138 HC OR TIME 0.5 TO 1 HR: Performed by: SURGERY

## 2018-05-23 PROCEDURE — 74011250636 HC RX REV CODE- 250/636

## 2018-05-23 PROCEDURE — 74011250636 HC RX REV CODE- 250/636: Performed by: SURGERY

## 2018-05-23 PROCEDURE — 74011000250 HC RX REV CODE- 250

## 2018-05-23 PROCEDURE — 77030031139 HC SUT VCRL2 J&J -A: Performed by: SURGERY

## 2018-05-23 PROCEDURE — 76060000033 HC ANESTHESIA 1 TO 1.5 HR: Performed by: SURGERY

## 2018-05-23 PROCEDURE — 74011000250 HC RX REV CODE- 250: Performed by: SURGERY

## 2018-05-23 PROCEDURE — 77030008522 HC TBNG INSUF LAPRO STRY -B: Performed by: SURGERY

## 2018-05-23 PROCEDURE — 77030021158 HC TRCR BLN GELPRT AMR -B: Performed by: SURGERY

## 2018-05-23 PROCEDURE — 77030008703 HC TU ET UNCUF COVD -A: Performed by: ANESTHESIOLOGY

## 2018-05-23 PROCEDURE — 77030008606 HC TRCR ENDOSC KII AMR -B: Performed by: SURGERY

## 2018-05-23 PROCEDURE — 77030020829: Performed by: SURGERY

## 2018-05-23 PROCEDURE — 77030020782 HC GWN BAIR PAWS FLX 3M -B: Performed by: ANESTHESIOLOGY

## 2018-05-23 PROCEDURE — 74011250637 HC RX REV CODE- 250/637: Performed by: NURSE PRACTITIONER

## 2018-05-23 PROCEDURE — 77030012022 HC APPL CLP ENDOSC COVD -C: Performed by: SURGERY

## 2018-05-23 PROCEDURE — 76210000006 HC OR PH I REC 0.5 TO 1 HR: Performed by: SURGERY

## 2018-05-23 PROCEDURE — 88304 TISSUE EXAM BY PATHOLOGIST: CPT | Performed by: SURGERY

## 2018-05-23 PROCEDURE — 77030011640 HC PAD GRND REM COVD -A: Performed by: SURGERY

## 2018-05-23 PROCEDURE — 77030039266 HC ADH SKN EXOFIN S2SG -A: Performed by: SURGERY

## 2018-05-23 PROCEDURE — 77030000038 HC TIP SCIS LAPSCP SURI -B: Performed by: SURGERY

## 2018-05-23 PROCEDURE — 77030002933 HC SUT MCRYL J&J -A: Performed by: SURGERY

## 2018-05-23 RX ORDER — HYDROMORPHONE HYDROCHLORIDE 2 MG/ML
0.5 INJECTION, SOLUTION INTRAMUSCULAR; INTRAVENOUS; SUBCUTANEOUS
Status: DISCONTINUED | OUTPATIENT
Start: 2018-05-23 | End: 2018-05-23 | Stop reason: HOSPADM

## 2018-05-23 RX ORDER — NALOXONE HYDROCHLORIDE 0.4 MG/ML
0.1 INJECTION, SOLUTION INTRAMUSCULAR; INTRAVENOUS; SUBCUTANEOUS AS NEEDED
Status: DISCONTINUED | OUTPATIENT
Start: 2018-05-23 | End: 2018-05-23 | Stop reason: HOSPADM

## 2018-05-23 RX ORDER — DEXAMETHASONE SODIUM PHOSPHATE 4 MG/ML
INJECTION, SOLUTION INTRA-ARTICULAR; INTRALESIONAL; INTRAMUSCULAR; INTRAVENOUS; SOFT TISSUE AS NEEDED
Status: DISCONTINUED | OUTPATIENT
Start: 2018-05-23 | End: 2018-05-23 | Stop reason: HOSPADM

## 2018-05-23 RX ORDER — ONDANSETRON 2 MG/ML
INJECTION INTRAMUSCULAR; INTRAVENOUS AS NEEDED
Status: DISCONTINUED | OUTPATIENT
Start: 2018-05-23 | End: 2018-05-23 | Stop reason: HOSPADM

## 2018-05-23 RX ORDER — FENTANYL CITRATE 50 UG/ML
INJECTION, SOLUTION INTRAMUSCULAR; INTRAVENOUS AS NEEDED
Status: DISCONTINUED | OUTPATIENT
Start: 2018-05-23 | End: 2018-05-23 | Stop reason: HOSPADM

## 2018-05-23 RX ORDER — SUCCINYLCHOLINE CHLORIDE 20 MG/ML
INJECTION INTRAMUSCULAR; INTRAVENOUS AS NEEDED
Status: DISCONTINUED | OUTPATIENT
Start: 2018-05-23 | End: 2018-05-23 | Stop reason: HOSPADM

## 2018-05-23 RX ORDER — ACETAMINOPHEN 500 MG
500 TABLET ORAL ONCE
Status: DISCONTINUED | OUTPATIENT
Start: 2018-05-23 | End: 2018-05-23 | Stop reason: HOSPADM

## 2018-05-23 RX ORDER — FENTANYL CITRATE 50 UG/ML
100 INJECTION, SOLUTION INTRAMUSCULAR; INTRAVENOUS AS NEEDED
Status: CANCELLED | OUTPATIENT
Start: 2018-05-23

## 2018-05-23 RX ORDER — DIPHENHYDRAMINE HYDROCHLORIDE 50 MG/ML
12.5 INJECTION, SOLUTION INTRAMUSCULAR; INTRAVENOUS ONCE
Status: DISCONTINUED | OUTPATIENT
Start: 2018-05-23 | End: 2018-05-23 | Stop reason: HOSPADM

## 2018-05-23 RX ORDER — GLYCOPYRROLATE 0.2 MG/ML
INJECTION INTRAMUSCULAR; INTRAVENOUS AS NEEDED
Status: DISCONTINUED | OUTPATIENT
Start: 2018-05-23 | End: 2018-05-23 | Stop reason: HOSPADM

## 2018-05-23 RX ORDER — SODIUM CHLORIDE 0.9 % (FLUSH) 0.9 %
5-10 SYRINGE (ML) INJECTION AS NEEDED
Status: DISCONTINUED | OUTPATIENT
Start: 2018-05-23 | End: 2018-05-23 | Stop reason: HOSPADM

## 2018-05-23 RX ORDER — OXYCODONE HYDROCHLORIDE 5 MG/1
5 TABLET ORAL
Status: DISCONTINUED | OUTPATIENT
Start: 2018-05-23 | End: 2018-05-23 | Stop reason: HOSPADM

## 2018-05-23 RX ORDER — HYDROCODONE BITARTRATE AND ACETAMINOPHEN 7.5; 325 MG/1; MG/1
1 TABLET ORAL
Qty: 30 TAB | Refills: 0 | Status: SHIPPED | OUTPATIENT
Start: 2018-05-23

## 2018-05-23 RX ORDER — LIDOCAINE HYDROCHLORIDE 10 MG/ML
0.1 INJECTION INFILTRATION; PERINEURAL AS NEEDED
Status: CANCELLED | OUTPATIENT
Start: 2018-05-23

## 2018-05-23 RX ORDER — SODIUM CHLORIDE, SODIUM LACTATE, POTASSIUM CHLORIDE, CALCIUM CHLORIDE 600; 310; 30; 20 MG/100ML; MG/100ML; MG/100ML; MG/100ML
1000 INJECTION, SOLUTION INTRAVENOUS CONTINUOUS
Status: CANCELLED | OUTPATIENT
Start: 2018-05-23 | End: 2018-05-24

## 2018-05-23 RX ORDER — OXYCODONE HYDROCHLORIDE 5 MG/1
10 TABLET ORAL
Status: DISCONTINUED | OUTPATIENT
Start: 2018-05-23 | End: 2018-05-23 | Stop reason: HOSPADM

## 2018-05-23 RX ORDER — PROPOFOL 10 MG/ML
INJECTION, EMULSION INTRAVENOUS AS NEEDED
Status: DISCONTINUED | OUTPATIENT
Start: 2018-05-23 | End: 2018-05-23 | Stop reason: HOSPADM

## 2018-05-23 RX ORDER — SODIUM CHLORIDE, SODIUM LACTATE, POTASSIUM CHLORIDE, CALCIUM CHLORIDE 600; 310; 30; 20 MG/100ML; MG/100ML; MG/100ML; MG/100ML
75 INJECTION, SOLUTION INTRAVENOUS CONTINUOUS
Status: DISCONTINUED | OUTPATIENT
Start: 2018-05-23 | End: 2018-05-23 | Stop reason: HOSPADM

## 2018-05-23 RX ORDER — SODIUM CHLORIDE 0.9 % (FLUSH) 0.9 %
5-10 SYRINGE (ML) INJECTION AS NEEDED
Status: CANCELLED | OUTPATIENT
Start: 2018-05-23

## 2018-05-23 RX ORDER — ROCURONIUM BROMIDE 10 MG/ML
INJECTION, SOLUTION INTRAVENOUS AS NEEDED
Status: DISCONTINUED | OUTPATIENT
Start: 2018-05-23 | End: 2018-05-23 | Stop reason: HOSPADM

## 2018-05-23 RX ORDER — NEOSTIGMINE METHYLSULFATE 1 MG/ML
INJECTION INTRAVENOUS AS NEEDED
Status: DISCONTINUED | OUTPATIENT
Start: 2018-05-23 | End: 2018-05-23 | Stop reason: HOSPADM

## 2018-05-23 RX ORDER — ONDANSETRON 2 MG/ML
4 INJECTION INTRAMUSCULAR; INTRAVENOUS ONCE
Status: DISCONTINUED | OUTPATIENT
Start: 2018-05-23 | End: 2018-05-23 | Stop reason: HOSPADM

## 2018-05-23 RX ORDER — MIDAZOLAM HYDROCHLORIDE 1 MG/ML
2 INJECTION, SOLUTION INTRAMUSCULAR; INTRAVENOUS
Status: CANCELLED | OUTPATIENT
Start: 2018-05-23

## 2018-05-23 RX ORDER — SODIUM CHLORIDE 0.9 % (FLUSH) 0.9 %
5-10 SYRINGE (ML) INJECTION EVERY 8 HOURS
Status: CANCELLED | OUTPATIENT
Start: 2018-05-23

## 2018-05-23 RX ORDER — LIDOCAINE HYDROCHLORIDE 20 MG/ML
INJECTION, SOLUTION EPIDURAL; INFILTRATION; INTRACAUDAL; PERINEURAL AS NEEDED
Status: DISCONTINUED | OUTPATIENT
Start: 2018-05-23 | End: 2018-05-23 | Stop reason: HOSPADM

## 2018-05-23 RX ORDER — PROMETHAZINE HYDROCHLORIDE 25 MG/1
25 TABLET ORAL
Qty: 20 TAB | Refills: 0 | Status: SHIPPED | OUTPATIENT
Start: 2018-05-23

## 2018-05-23 RX ORDER — BUPIVACAINE HYDROCHLORIDE 5 MG/ML
INJECTION, SOLUTION EPIDURAL; INTRACAUDAL AS NEEDED
Status: DISCONTINUED | OUTPATIENT
Start: 2018-05-23 | End: 2018-05-23 | Stop reason: HOSPADM

## 2018-05-23 RX ORDER — HYDROCODONE BITARTRATE AND ACETAMINOPHEN 7.5; 325 MG/1; MG/1
1 TABLET ORAL
Status: DISCONTINUED | OUTPATIENT
Start: 2018-05-23 | End: 2018-05-23 | Stop reason: HOSPADM

## 2018-05-23 RX ADMIN — ROCURONIUM BROMIDE 20 MG: 10 INJECTION, SOLUTION INTRAVENOUS at 05:19

## 2018-05-23 RX ADMIN — ONDANSETRON 4 MG: 2 INJECTION INTRAMUSCULAR; INTRAVENOUS at 05:51

## 2018-05-23 RX ADMIN — HYDROCODONE BITARTRATE AND ACETAMINOPHEN 1 TABLET: 7.5; 325 TABLET ORAL at 13:15

## 2018-05-23 RX ADMIN — FENTANYL CITRATE 50 MCG: 50 INJECTION, SOLUTION INTRAMUSCULAR; INTRAVENOUS at 05:39

## 2018-05-23 RX ADMIN — DEXAMETHASONE SODIUM PHOSPHATE 4 MG: 4 INJECTION, SOLUTION INTRA-ARTICULAR; INTRALESIONAL; INTRAMUSCULAR; INTRAVENOUS; SOFT TISSUE at 05:19

## 2018-05-23 RX ADMIN — FENTANYL CITRATE 50 MCG: 50 INJECTION, SOLUTION INTRAMUSCULAR; INTRAVENOUS at 05:27

## 2018-05-23 RX ADMIN — GLYCOPYRROLATE 0.6 MG: 0.2 INJECTION INTRAMUSCULAR; INTRAVENOUS at 05:51

## 2018-05-23 RX ADMIN — SUCCINYLCHOLINE CHLORIDE 120 MG: 20 INJECTION INTRAMUSCULAR; INTRAVENOUS at 05:15

## 2018-05-23 RX ADMIN — FENTANYL CITRATE 50 MCG: 50 INJECTION, SOLUTION INTRAMUSCULAR; INTRAVENOUS at 05:15

## 2018-05-23 RX ADMIN — FENTANYL CITRATE 50 MCG: 50 INJECTION, SOLUTION INTRAMUSCULAR; INTRAVENOUS at 05:49

## 2018-05-23 RX ADMIN — PROPOFOL 180 MG: 10 INJECTION, EMULSION INTRAVENOUS at 05:15

## 2018-05-23 RX ADMIN — HYDROMORPHONE HYDROCHLORIDE 1 MG: 1 INJECTION, SOLUTION INTRAMUSCULAR; INTRAVENOUS; SUBCUTANEOUS at 07:35

## 2018-05-23 RX ADMIN — LIDOCAINE HYDROCHLORIDE 60 MG: 20 INJECTION, SOLUTION EPIDURAL; INFILTRATION; INTRACAUDAL; PERINEURAL at 05:15

## 2018-05-23 RX ADMIN — ROCURONIUM BROMIDE 10 MG: 10 INJECTION, SOLUTION INTRAVENOUS at 05:15

## 2018-05-23 RX ADMIN — NEOSTIGMINE METHYLSULFATE 3 MG: 1 INJECTION INTRAVENOUS at 05:51

## 2018-05-23 NOTE — ANESTHESIA PREPROCEDURE EVALUATION
Anesthetic History   No history of anesthetic complications            Review of Systems / Medical History  Patient summary reviewed and pertinent labs reviewed    Pulmonary  Within defined limits                 Neuro/Psych   Within defined limits           Cardiovascular  Within defined limits                Exercise tolerance: >4 METS     GI/Hepatic/Renal  Within defined limits              Endo/Other             Other Findings              Physical Exam    Airway  Mallampati: II  TM Distance: 4 - 6 cm  Neck ROM: normal range of motion   Mouth opening: Normal     Cardiovascular    Rhythm: regular  Rate: normal         Dental  No notable dental hx       Pulmonary  Breath sounds clear to auscultation               Abdominal  GI exam deferred       Other Findings            Anesthetic Plan    ASA: 2, emergent  Anesthesia type: general          Induction: Intravenous  Anesthetic plan and risks discussed with: Patient

## 2018-05-23 NOTE — ED NOTES
TRANSFER - OUT REPORT:    Verbal report given to [facility] RN on [address]  being transferred to 222(unit) for routine progression of care       Report consisted of patients Situation, Background, Assessment and   Recommendations(SBAR). Information from the following report(s) SBAR, ED Summary and MAR was reviewed with the receiving nurse. Lines:   Peripheral IV 05/22/18 Left Antecubital (Active)   Site Assessment Clean, dry, & intact 5/22/2018  7:18 PM   Phlebitis Assessment 0 5/22/2018  7:18 PM   Infiltration Assessment 0 5/22/2018  7:18 PM   Dressing Status Clean, dry, & intact 5/22/2018  7:18 PM   Hub Color/Line Status Pink 5/22/2018  7:18 PM   Alcohol Cap Used No 5/22/2018  7:18 PM        Opportunity for questions and clarification was provided.       Patient transported with:   [employer]

## 2018-05-23 NOTE — PROGRESS NOTES
Rounded with nurse in PACU and with unit nurse, Interpreting Services offered when needed. Manish Yanez Lolis Encarnacion  Patient [email or web address]  c: [phone] / Rocco Lees / Tang, [facility]  [email or web address]

## 2018-05-23 NOTE — PERIOP NOTES
TRANSFER - OUT REPORT:    Verbal report given to [employer] RN on [address] [facility]  being transferred to [address] [place] [address] for routine post - op       Report consisted of patients Situation, Background, Assessment and   Recommendations(SBAR). Information from the following report(s) SBAR, OR Summary, Procedure Summary, Intake/Output and MAR was reviewed with the receiving nurse. Lines:   Peripheral IV 05/22/18 Left Antecubital (Active)   Site Assessment Clean, dry, & intact 5/23/2018  6:35 AM   Phlebitis Assessment 0 5/23/2018  6:35 AM   Infiltration Assessment 0 5/23/2018  6:35 AM   Dressing Status Clean, dry, & intact; Occlusive 5/23/2018  6:35 AM   Dressing Type Transparent;Tape 5/23/2018  6:35 AM   Hub Color/Line Status Pink; Infusing 5/23/2018  6:35 AM   Alcohol Cap Used No 5/23/2018  6:35 AM        Opportunity for questions and clarification was provided. Patient transported with:   O2 @ 2 liters    VTE prophylaxis orders have been written for [address] [facility]. Patient and family given floor number and nurses name. Family updated re: pt status after security code verified.

## 2018-05-23 NOTE — PROGRESS NOTES
Pt's D/C instructions completed. Verbalized understanding of all instructions including diet, activity, s/sx to alert MD, medications, wound care, and f/u appointment. Family at [place].

## 2018-05-23 NOTE — H&P
Mandi 34 [address] [facility]  [phone]     History and Physical/Surgical Consult   Luisa Taylor date: 2018    MRN: [id number]     : 1985     Age: 34 y.o.          2018 2:43 AM    Subjective/HPI:   This patient is a 34 y.o. seen and evaluated at the request of Dr. Erika Fountain in ED. Pt complains of severe right upper abdominal pain of several days duration. Her pain became intolerable tonight and she came to ED. Review of Systems  A comprehensive review of systems was negative except for: Gastrointestinal: positive for nausea, vomiting, change in bowel habits and abdominal pain  History reviewed. No pertinent past medical history. History reviewed. No pertinent surgical history. No Known Allergies   Social History   Substance Use Topics    Smoking status: Never Smoker    Smokeless tobacco: Never Used    Alcohol use No      Social History     Social History Narrative    No narrative on file     History reviewed. No pertinent family history. Prior to Admission Medications   Prescriptions Last Dose Informant Patient Reported? Taking?   diclofenac potassium (CATAFLAM) 50 mg tablet   Yes Yes   Sig: Take 50 mg by mouth three (3) times daily.       Facility-Administered Medications: None     Current Facility-Administered Medications   Medication Dose Route Frequency    0.9% sodium chloride infusion  150 mL/hr IntraVENous CONTINUOUS    0.9% sodium chloride infusion  125 mL/hr IntraVENous CONTINUOUS    ondansetron (ZOFRAN) injection 4 mg  4 mg IntraVENous Q4H PRN    piperacillin-tazobactam (ZOSYN) 3.375 g in 0.9% sodium chloride (MBP/ADV) 100 mL  3.375 g IntraVENous Q8H    HYDROmorphone (DILAUDID) syringe 1 mg  1 mg IntraVENous Q3H PRN     Objective:     Vitals:    18 2200 18 2301 18 2348 18 2353   BP: 90/52 94/54  (!) 89/61   Pulse:    82   Resp:    Temp:    98 °F (36.7 °C)   SpO2: 93% 94%  94%   Height:   5' 3\" (1.6 m)            Physical Exam:   Gen- the patient is well developed and in no acute distress  HEENT- PERRL, EOMI, no scleral icterus       nose without alar flaring or epistaxis                  oral muscosa moist without cyanosis  Neck- no JVD or retractions  Lungs- resp even/unlab   Heart- RRR   Abd- tender to palpation in RUQ with guarding. No mass or hernia found     Ext- warm without cyanosis. There is no lower leg edema. Skin- no jaundice or rashes  Neuro- alert and oriented x 3. No gross sensorimotor deficits are present. Data Review   Recent Labs      05/22/18 1919   WBC  18.3*   HGB  11.4*   HCT  31.5*   PLT  235     Recent Labs      05/22/18 1919   NA  139   K  3.2*   CL  105   CO2  24   GLU  106*   BUN  40*   CREA  1.67*     US Results (most recent):    Results from Hospital Encounter encounter on 05/22/18   US ABD LTD   Narrative Right Upper Quadrant Ultrasound    INDICATION:  Acute moderate to severe right upper abdominal pain and fever    COMPARISON: none    TECHNIQUE:  Sonographic gray scale and Doppler images were obtained of the right  upper quadrant of the abdomen. FINDINGS: There are no discrete lesions in the visualized portions of the liver. Liver size is 17.3 cm, within normal limits. Main portal vein is patent on Doppler imaging. There is no bile duct dilatation. The common bile duct diameter is 4-5 mm. The  gallbladder is normal in size and morphology. No gallstones are seen. There is  nonspecific 5-6 mm wall thickening. Sonographic Laughlin's sign is reportedly  positive. There are no discrete lesions in the limited visualized portions of the  pancreas. The right kidney measures 13.4 cm in length. There is no hydronephrosis. There  is no evidence of a solid renal mass. Color Doppler demonstrates expected right  renal flow. There is no evidence of ascites. The aorta and IVC are patent on Doppler imaging.   Aortic diameter is within  normal limits. Impression IMPRESSION:   1. Positive sonographic Laughlin's sign and gallbladder wall thickening,  suspicious for acalculous cholecystitis. 2. No biliary dilatation. Assessment:     Hospital Problems  Never Reviewed          Codes Class Noted POA    Acute cholecystitis ICD-10-CM: K81.0  ICD-9-CM: 575.0  5/22/2018 Unknown            Plan:     Findings consistent with acute cholecystitis with elevation of LFT's and Tbili  Recommended laparoscopic, possible open cholecystectomy   I have discussed the risks, benefits and alternatives of surgery. Pt understands and wishes to proceed.   Consent obtained         Cecelia Black MD

## 2018-05-23 NOTE — ROUTINE PROCESS
TRANSFER - IN REPORT:    Verbal report received from [address] [facility] on [address] [facility] [address]  being received from PACU for routine post - op      Report consisted of patients Situation, Background, Assessment and   Recommendations(SBAR). Information from the following report(s) SBAR, OR Summary, Intake/Output and MAR was reviewed with the receiving nurse. Opportunity for questions and clarification was provided. Assessment completed upon patients arrival to unit and care assumed.

## 2018-05-23 NOTE — ANESTHESIA POSTPROCEDURE EVALUATION
Post-Anesthesia Evaluation and Assessment    Patient: Roslyn Escobar MRN: [id number]  SSN: [id number]    YOB: 1985  Age: 34 y.o. Sex: female       Cardiovascular Function/Vital Signs  Visit Vitals    /67    Pulse (!) 107    Temp 36.8 °C (98.2 °F)    Resp 16    Ht 5' 3\" (1.6 m)    Wt 71.7 kg (158 lb)    SpO2 96%    BMI 27.99 kg/m2       Patient is status post general anesthesia for Procedure(s):  CHOLECYSTECTOMY LAPAROSCOPIC. Nausea/Vomiting: None    Postoperative hydration reviewed and adequate. Pain:  Pain Scale 1: Numeric (0 - 10) (05/23/18 0606)  Pain Intensity 1: 0 (05/23/18 0606)   Managed    Neurological Status:   Neuro (WDL): Within Defined Limits (05/23/18 0606)   At baseline    Mental Status and Level of Consciousness: Arousable    Pulmonary Status:   O2 Device: Nasal cannula (05/23/18 0606)   Adequate oxygenation and airway patent    Complications related to anesthesia: None    Post-anesthesia assessment completed.  No concerns    Signed By: Jacob Valadez MD     May 23, 2018

## 2018-05-23 NOTE — OP NOTES
Mandi 34, [address] [facility]  [phone]    [address] [facility] date: 2018    MRN: [id number]     : 1985     Age: 34 y.o.          2018 5:57 AM    Cholecystectomy     Indications: Pt presented with acute cholecystitis. Risks, benefits, and alternatives to cholecystectomy were discussed with the patient. Appropriate consent was obtained. Pre-Op Diagnosis: cholecysticitis    Post-Op Diagnosis:  cholecysticitis      Surgeon: Latanya Means MD    Assistants: Surgeon(s):  Latanya Means MD    Anesthesia:  General      Findings: inflamed gallbladder     Estimated Blood Loss:              Specimens: gallbladder           Implants: * No implants in log *      Procedure Details     The patient was brought to the operating room and placed supine. After induction of a general anesthetic, the abdomen was prepped and draped in standard fashion. An incision was made in the umbilicus and a Leann trocar was placed in the usual fashion after which  the abdomen was insufflated to 15mmHg sterile CO2. The other trocars were then placed under direct vision. These were positioned four fingerbreadths below the right costal margin in the anterior axillary line and mid clavicular line and just to the right of the falciform ligament in the epigatrium. The gallbladder was grasped at the dome and retracted laterally and to the right in order to gain the critical view. Dissection was begun with maryland dissector at the infundibulum of the gallbladder until the cystic duct was positively identified as a singular structure going directly into the gallbladder. The cystic duct was then clipped twice inferiorly and superiorly and divided between with scissors. The cystic artery was then clipped and divided in the cystic triangle. The gallbladder was then taken off the liver bed with electrocautery hook.   No gross spillage of bile was encountered during the dissection. The gallbladder was then removed from the superior port site. The gallbladder was sent to pathology for evaluation at this point. The gallbladder fossa was without evidence of bleeding, there was no bleeding from the cystic artery stump and there was no evidence of bile leak from the cystic duct stump. The three upper abdominal trocars were removed under the direct vision without bleeding from the trocar sites. The Leann trocar was removed and the fascia of the umbilicus was closed with 0'0  Vicryl. The trocar sites were closed with the subcuticular Monocryl dermabond dressing applied. The patient was taken to the recovery room in good condition, having tolerated the procedure well. Instrument, sponge, and needle counts were correct prior to abdominal closure and at the conclusion of the case.      Signed: Teena Ga MD

## 2018-05-23 NOTE — DISCHARGE INSTRUCTIONS
Discharge Instructions:    1. May shower. No tub baths. 2. Diet: as tolerated. 3. No driving while taking pain medication. 4. No lifting over 10 pounds. Colecistectomía: Kristie Bae en el hogar - [ Cholecystectomy: What to Expect at [facility] ]  Zhao recuperación  Después de la cirugía, es normal sentirse débil y fatigado por varios días después de regresar al hogar. Es posible que tenga el abdomen hinchado. Si le willson hecho jonatan cirugía laparoscópica, también podría sentir dolor en el hombro radha unas 24 horas. Es posible que tenga gases o necesite eructar mucho al principio, y a [address]. La diarrea suele desaparecer en el transcurso de 2 a 4 semanas, jimmy podría durar más. El tiempo que tarde en recuperarse depende de si le willson hecho jonatan cirugía laparoscópica o abierta. · En el georges de [facility] laparoscópica, la mayoría de las personas pueden volver a trabajar o hacer zhao rutina habitual en 1 a 2 semanas, jimmy podría tardar más, según el tipo de trabajo que aleah. · En el georges de [place], es probable que tarde de 4 a 6 semanas en poder volver a zhao rutina habitual.  Esta hoja de [employer] idea general del tiempo que le llevará recuperarse. No obstante, cada persona se recupera a un ritmo diferente. Siga los pasos que se mencionan a continuación para recuperarse lo más rápido posible. ¿Cómo puede cuidarse en el hogar? Actividad  ? · Descanse cuando se sienta fatigado. Dormir lo suficiente le ayudará a recuperarse. ? · Intente caminar todos los días. Comience caminando un poco más de lo que caminó el día anterior. Aumente en forma gradual la distancia que camina. Caminar aumenta el flujo de [place] y [place] a prevenir la neumonía y el estreñimiento. ? · Evite levantar cualquier cosa que implique un esfuerzo radha unas 2 a 4 semanas.  [place] podría incluir un angélica, bolsas de las compras y envases de [place] pesados, mochilas o maletines pesados, bolsas de arena para excremento de logan o alimentos para perros, o jonatan aspiradora. ? · Evite actividades vigorosas, jerry montar en bicicleta, trotar, levantar pesas y hacer ejercicio aeróbico, hasta que zhao médico lo apruebe. ? · Puede ducharse entre 24 y 50 horas después de la [facility] [place], si zhao médico lo [place]. Seque el ken (incisión) con toques suaves de toalla. No tome malia de inmersión radha las primeras 2 semanas o hasta que zhao médico lo apruebe. ? · Puede conducir cuando ya no esté tomando analgésicos (medicamentos para el dolor) y pueda desplazar con rapidez zhao pie del acelerador al freno. También debe estar en condiciones de poder permanecer sentado con comodidad radha un tiempo alexx, aun si no piensa ir muy lejos. Merleen Hails atascado en el tráfico.   ? · Para la cirugía laparoscópica, la mayoría de las personas pueden volver a trabajar o hacer zhao rutina normal en 1 a 2 semanas, aunque podrían [employer]. En el georges de [place], es probable que tarde de 4 a 6 semanas en poder volver a zhao rutina habitual.   ? · Zhao médico le indicará cuándo puede volver a tener relaciones sexuales. ?Dieta  ? · Coma cantidades más pequeñas varias veces al día en lugar de pocas veces y en grandes cantidades. Puede seguir jonatan dieta normal, jimmy evite los alimentos grasosos por 1 mes aproximadamente. Entre los alimentos grasosos se [employer], la [place], [place] queso y muchos aperitivos. Si tiene [employer], coma alimentos suaves bajos en grasa, jerry arroz sin condimentar, gavino a la she, pan francisca y yogur. ? · Marcy abundantes líquidos (a menos que zhao médico le indique lo contrario). ? · Si tiene diarrea, evite los alimentos condimentados, los productos lácteos, los alimentos grasosos y el alcohol. También puede observar si la causa es algún alimento en particular y dejar de comerlo.  Si la diarrea continúa radha más de 2 semanas, hable con zhoa médico.   ? · Podría notar que no evacua el intestino con regularidad steffi después de la [place]. [place] es común. Trate de evitar el estreñimiento y de no hacer esfuerzos cuando evacua el intestino. Sería conveniente yves un suplemento de [employer]. Si no ha evacuado el intestino después de un par de días, pregúntele a zhao médico si puede yves un laxante suave. Medicamentos  ? · Zhao médico le dirá si puede volver a yves delia medicamentos y cuándo puede volver a hacerlo. También le dará indicaciones sobre cualquier medicamento nuevo que deba yves usted. ? · Si phi medicamentos que previenen la formación de coágulos de [place], jerry warfarina (Coumadin), clopidogrel (Plavix) o aspirina, asegúrese de hablar con zhao médico. Él o pam le dirá si debe volver a yves estos medicamentos y en qué momento. Asegúrese de que entiende exactamente lo que el médico quiere que aleah. ? · [employer] analgésicos exactamente [facility] fueron indicados. ¨ Si el médico le recetó analgésicos, tómelos según las indicaciones. ¨ Si no está tomando analgésicos recetados, tome neil de [employer], prema jerry acetaminofén (Tylenol), ibuprofeno (Advil, Motrin) o naproxeno (Aleve). Shikha y siga todas las instrucciones de la [place]. ¨ No tome dos o más analgésicos al [employer], a menos que el médico se lo haya indicado. Muchos analgésicos contienen acetaminofén, es decir, Tylenol. El exceso de Tylenol puede ser dañino. ? · Si le parece que el analgésico le está produciendo revoltura del estómago:  ¨ [place] el medicamento después de las comidas (a menos que zhao médico le indique lo contrario). ¨ [place] al médico un analgésico diferente. ? · Si zhao médico le recetó antibióticos, tómelos según las indicaciones. No deje de tomarlos por el hecho de sentirse mejor. Debe yves todos los antibióticos hasta terminarlos. Cuidado de la incisión  ?  · Si le willson puesto tiras de cinta [employer] incisión, o ken, déjeselas puestas radha jonatan semana o hasta que se caigan por sí solas. ? · Después de 24 a 48 horas, lave la jason a diario con [place] tibia y séquela con toques suaves de toalla. ? · Es posible que tenga grapas para mantener el ken cerrado. Manténgalas secas hasta que zhao [address] [place] es por lo general en 7 a 10 días. ? · Mantenga la jason limpia y [place]. Puede cubrirla con jonatan venda de gasa si supura o roza contra la ropa. Cambie la venda todos los cristina. ? Hielo  ? · Para reducir la hinchazón y el dolor, colóquese hielo o jonatan compresa fría sobre el abdomen radha 10 a 20 minutos cada vez. Shandra esto cada 1 a 2 horas. Póngase un paño kwon entre el hielo y la piel. La atención de seguimiento es jonatan parte clave de zhao tratamiento y seguridad. Asegúrese de hacer y acudir a todas las citas, y llame a zhao médico si está teniendo problemas. También es jonatan buena idea saber los resultados de los exámenes y mantener jonatan lista de los medicamentos que phi. ¿Cuándo debe pedir ayuda? Llame al 911 en cualquier momento que considere que necesita atención de [place]. Por ejemplo, llame si:  ? · Se desmayó (perdió el conocimiento). ? · [address] dificultades para respirar. ? · Tiene dolor repentino en el pecho y falta de [place], o tose batsheva. ?Llame a zhao médico ahora mismo o busque atención médica inmediata si:  ? · Siente el estómago revuelto y no puede beber líquidos. ? · Tiene dolor que no mejora después de yves analgésicos. ? · Tiene señales de infección, tales jerry:  ¨ Aumento del dolor, la hinchazón, el enrojecimiento o la temperatura. ¨ Vetas rojizas que salen de la incisión. ¨ Pus que supura de la incisión. ¨ Ganglios linfáticos inflamados en el scout, las axilas o la sindhu. Deborah Raid. ? · La orina es de color amarronado oscuro o las heces son de color denisse o del color de la arcilla. ? · La piel o la parte chloé de los ojos se le ponen amarillentas.    ? · La venda colocada sobre la incisión se empapa con batsheva de color freeman vivo. ? · Tiene señales de un coágulo de batsheva, tales jerry:  ¨ Dolor en la pantorrilla, el muslo, la sindhu o detrás de la rodilla. ¨ Enrojecimiento e hinchazón en la pierna o la sindhu. ? · Tiene problemas para orinar o evacuar el intestino, en especial si tiene dolor leve o hinchazón en la parte baja del abdomen. ?Preste especial atención a cualquier cambio en zhao kennedi y asegúrese de comunicarse con zhao médico si:  ? · Le willson hecho jonatan cirugía laparoscópica y el dolor en el hombro dura más de 24 horas. ? · No evacua el intestino después de yves un laxante. ¿Dónde puede encontrar más información en inglés? [place] a http://tanika-john.info/. Claudia Trivedi F357 en la búsqueda para aprender más acerca de \"Colecistectomía: Jessika Chaves en el [facility] - [ Cholecystectomy: What to Expect at [facility] ]. \"  Revisado: 12 ray, 2017  Versión del contenido: 11.4  © 6274-2304 Healthwise, Incorporated. Las instrucciones de cuidado fueron adaptadas bajo licencia por [employer] (which disclaims liability or warranty for this information). Si usted tiene [place] afección médica o sobre estas instrucciones, siempre pregunte a zhao profesional de kennedi. Healthwise, Incorporated niega toda garantía o responsabilidad por zhao uso de esta información. DISCHARGE SUMMARY from Nurse    PATIENT INSTRUCTIONS:    After general anesthesia or intravenous sedation, for 24 hours or while taking prescription Narcotics:  · Limit your activities  · Do not drive and operate hazardous machinery  · Do not make important personal or business decisions  · Do  not drink alcoholic beverages  · If you have not urinated within 8 hours after discharge, please contact your surgeon on call.     Report the following to your surgeon:  · Excessive pain, swelling, redness or odor of or around the surgical area  · Temperature over 100.5  · Nausea and vomiting lasting longer than 4 hours or if unable to take medications  · Any signs of decreased circulation or nerve impairment to extremity: change in color, persistent  numbness, tingling, coldness or increase pain  · Any questions    What to do at Home:  Recommended activity: See surgical instructions, diet as tolerated. *  Please give a list of your current medications to your Primary Care Provider. *  Please update this list whenever your medications are discontinued, doses are      changed, or new medications (including over-the-counter products) are added. *  Please carry medication information at all times in case of emergency situations. These are general instructions for a healthy lifestyle:    No smoking/ No tobacco products/ Avoid exposure to second hand smoke  Surgeon General's Warning:  Quitting smoking now greatly reduces serious risk to your health. Obesity, smoking, and sedentary lifestyle greatly increases your risk for illness    A healthy diet, regular physical exercise & weight monitoring are important for maintaining a healthy lifestyle    You may be retaining fluid if you have a history of heart failure or if you experience any of the following symptoms:  Weight gain of 3 pounds or more overnight or 5 pounds in a week, increased swelling in our hands or feet or shortness of breath while lying flat in bed. Please call your doctor as soon as you notice any of these symptoms; do not wait until your next office visit. Recognize signs and symptoms of STROKE:    F-face looks uneven    A-arms unable to move or move unevenly    S-speech slurred or non-existent    T-time-call 911 as soon as signs and symptoms begin-DO NOT go       Back to bed or wait to see if you get better-TIME IS BRAIN. Warning Signs of HEART ATTACK     Call 911 if you have these symptoms:   Chest discomfort. Most heart attacks involve discomfort in the center of the chest that lasts more than a few minutes, or that goes away and comes back.  It can feel like uncomfortable pressure, squeezing, fullness, or pain.  Discomfort in other areas of the upper body. Symptoms can include pain or discomfort in one or both arms, the back, neck, jaw, or stomach.  Shortness of breath with or without chest discomfort.  Other signs may include breaking out in a cold sweat, nausea, or lightheadedness. Don't wait more than five minutes to call 911 - MINUTES MATTER! Fast action can save your life. Calling 911 is almost always the fastest way to get lifesaving treatment. Emergency Medical Services staff can begin treatment when they arrive -- up to an hour sooner than if someone gets to the hospital by car. The discharge information has been reviewed with the patient. The patient verbalized understanding. Discharge medications reviewed with the patient and appropriate educational materials and side effects teaching were provided.   ___________________________________________________________________________________________________________________________________

## 2018-05-23 NOTE — ROUTINE PROCESS
Primary Nurse Flores Sloan LPN and Deepa Stahl RN performed a dual skin assessment on this patientt, skin is clean, dry, warm to touch & intact. No noted redness or breakdown. Trav score is 22.

## 2018-05-26 ENCOUNTER — APPOINTMENT (OUTPATIENT)
Dept: GENERAL RADIOLOGY | Age: 33
End: 2018-05-26
Attending: EMERGENCY MEDICINE
Payer: SELF-PAY

## 2018-05-26 ENCOUNTER — HOSPITAL ENCOUNTER (EMERGENCY)
Age: 33
Discharge: HOME | End: 2018-05-26
Attending: EMERGENCY MEDICINE
Payer: SELF-PAY

## 2018-05-26 VITALS
WEIGHT: 149 LBS | HEIGHT: 63 IN | RESPIRATION RATE: 16 BRPM | TEMPERATURE: 98.1 F | SYSTOLIC BLOOD PRESSURE: 115 MMHG | HEART RATE: 69 BPM | OXYGEN SATURATION: 99 % | BODY MASS INDEX: 26.4 KG/M2 | DIASTOLIC BLOOD PRESSURE: 76 MMHG

## 2018-05-26 DIAGNOSIS — N39.0 URINARY TRACT INFECTION WITHOUT HEMATURIA, SITE UNSPECIFIED: Primary | ICD-10-CM

## 2018-05-26 LAB
ALBUMIN SERPL-MCNC: 2.3 G/DL (ref 3.5–5)
ALBUMIN/GLOB SERPL: 0.5 {RATIO} (ref 1.2–3.5)
ALP SERPL-CCNC: 201 U/L (ref 50–136)
ALT SERPL-CCNC: 39 U/L (ref 12–65)
ANION GAP SERPL CALC-SCNC: 10 MMOL/L (ref 7–16)
AST SERPL-CCNC: 26 U/L (ref 15–37)
BACTERIA URNS QL MICRO: ABNORMAL /HPF
BILIRUB SERPL-MCNC: 1.2 MG/DL (ref 0.2–1.1)
BUN SERPL-MCNC: 16 MG/DL (ref 6–23)
CALCIUM SERPL-MCNC: 8.1 MG/DL (ref 8.3–10.4)
CASTS URNS QL MICRO: ABNORMAL /LPF
CHLORIDE SERPL-SCNC: 101 MMOL/L (ref 98–107)
CO2 SERPL-SCNC: 28 MMOL/L (ref 21–32)
CREAT SERPL-MCNC: 0.73 MG/DL (ref 0.6–1)
DIFFERENTIAL METHOD BLD: ABNORMAL
EPI CELLS #/AREA URNS HPF: ABNORMAL /HPF
ERYTHROCYTE [DISTWIDTH] IN BLOOD BY AUTOMATED COUNT: 13.8 % (ref 11.9–14.6)
GLOBULIN SER CALC-MCNC: 5.1 G/DL (ref 2.3–3.5)
GLUCOSE SERPL-MCNC: 98 MG/DL (ref 65–100)
HCG UR QL: NEGATIVE
HCT VFR BLD AUTO: 36.7 % (ref 35.8–46.3)
HGB BLD-MCNC: 13.1 G/DL (ref 11.7–15.4)
LYMPHOCYTES # BLD: 2.6 K/UL (ref 0.5–4.6)
LYMPHOCYTES NFR BLD MANUAL: 22 % (ref 16–44)
MCH RBC QN AUTO: 31 PG (ref 26.1–32.9)
MCHC RBC AUTO-ENTMCNC: 35.7 G/DL (ref 31.4–35)
MCV RBC AUTO: 86.8 FL (ref 79.6–97.8)
METAMYELOCYTES NFR BLD MANUAL: 4 %
MONOCYTES # BLD: 0.6 K/UL (ref 0.1–1.3)
MONOCYTES NFR BLD MANUAL: 5 % (ref 3–9)
NEUTS BAND NFR BLD MANUAL: 11 % (ref 0–10)
NEUTS SEG # BLD: 8.7 K/UL (ref 1.7–8.2)
NEUTS SEG NFR BLD MANUAL: 58 % (ref 47–75)
PLATELET # BLD AUTO: 314 K/UL (ref 150–450)
PLATELET COMMENTS,PCOM: ADEQUATE
PMV BLD AUTO: 10.4 FL (ref 10.8–14.1)
POTASSIUM SERPL-SCNC: 3.1 MMOL/L (ref 3.5–5.1)
PROT SERPL-MCNC: 7.4 G/DL (ref 6.3–8.2)
RBC # BLD AUTO: 4.23 M/UL (ref 4.05–5.25)
RBC #/AREA URNS HPF: ABNORMAL /HPF
RBC MORPH BLD: ABNORMAL
SODIUM SERPL-SCNC: 139 MMOL/L (ref 136–145)
WBC # BLD AUTO: 11.9 K/UL (ref 4.3–11.1)
WBC URNS QL MICRO: ABNORMAL /HPF

## 2018-05-26 PROCEDURE — 74011250636 HC RX REV CODE- 250/636: Performed by: EMERGENCY MEDICINE

## 2018-05-26 PROCEDURE — 81015 MICROSCOPIC EXAM OF URINE: CPT | Performed by: EMERGENCY MEDICINE

## 2018-05-26 PROCEDURE — 80053 COMPREHEN METABOLIC PANEL: CPT | Performed by: EMERGENCY MEDICINE

## 2018-05-26 PROCEDURE — 87088 URINE BACTERIA CULTURE: CPT | Performed by: EMERGENCY MEDICINE

## 2018-05-26 PROCEDURE — 87186 SC STD MICRODIL/AGAR DIL: CPT | Performed by: EMERGENCY MEDICINE

## 2018-05-26 PROCEDURE — 85025 COMPLETE CBC W/AUTO DIFF WBC: CPT | Performed by: EMERGENCY MEDICINE

## 2018-05-26 PROCEDURE — 81003 URINALYSIS AUTO W/O SCOPE: CPT | Performed by: EMERGENCY MEDICINE

## 2018-05-26 PROCEDURE — 74022 RADEX COMPL AQT ABD SERIES: CPT

## 2018-05-26 PROCEDURE — 81025 URINE PREGNANCY TEST: CPT

## 2018-05-26 PROCEDURE — 74011000258 HC RX REV CODE- 258: Performed by: EMERGENCY MEDICINE

## 2018-05-26 PROCEDURE — 99284 EMERGENCY DEPT VISIT MOD MDM: CPT | Performed by: EMERGENCY MEDICINE

## 2018-05-26 PROCEDURE — 96375 TX/PRO/DX INJ NEW DRUG ADDON: CPT | Performed by: EMERGENCY MEDICINE

## 2018-05-26 PROCEDURE — 96365 THER/PROPH/DIAG IV INF INIT: CPT | Performed by: EMERGENCY MEDICINE

## 2018-05-26 PROCEDURE — 87086 URINE CULTURE/COLONY COUNT: CPT | Performed by: EMERGENCY MEDICINE

## 2018-05-26 RX ORDER — CEPHALEXIN 500 MG/1
500 CAPSULE ORAL 4 TIMES DAILY
Qty: 28 CAP | Refills: 0 | Status: SHIPPED | OUTPATIENT
Start: 2018-05-26 | End: 2018-06-02

## 2018-05-26 RX ORDER — ONDANSETRON 2 MG/ML
4 INJECTION INTRAMUSCULAR; INTRAVENOUS
Status: COMPLETED | OUTPATIENT
Start: 2018-05-26 | End: 2018-05-26

## 2018-05-26 RX ADMIN — CEFTRIAXONE SODIUM 1 G: 1 INJECTION, POWDER, FOR SOLUTION INTRAMUSCULAR; INTRAVENOUS at 21:46

## 2018-05-26 RX ADMIN — ONDANSETRON 4 MG: 2 INJECTION INTRAMUSCULAR; INTRAVENOUS at 22:22

## 2018-05-26 NOTE — ED TRIAGE NOTES
Pt had gallbladder removed here on Wednesday and pt c/o having increased pain, generalized aches, fever, nausea and vomiting, malaise, fatigue and anorexia since Thursday.

## 2018-05-27 NOTE — ED NOTES
I have reviewed, via , discharge instructions with the patient and spouse. The patient and spouse verbalized understanding. Patient left ED via Discharge Method: ambulatory to Home with self and spouse. Opportunity for questions and clarification provided. Patient given 2 scripts. To continue your aftercare when you leave the hospital, you may receive an automated call from our care team to check in on how you are doing. This is a free service and part of our promise to provide the best care and service to meet your aftercare needs.  If you have questions, or wish to unsubscribe from this service please call [phone]. Thank you for Choosing our [facility].

## 2018-05-27 NOTE — DISCHARGE INSTRUCTIONS
Infección urinaria en las mujeres: Instrucciones de cuidado - [ Urinary Tract Infection in Women: Care Instructions ]  Instrucciones de cuidado    Jonatan infección urinaria (UTI, por delia siglas en inglés) es un término general que hace referencia a jonatan infección que se produce en cualquier parte entre los riñones y la uretra (conducto por el cual se expulsa la orina). La mayoría de las UTI son infecciones de la vejiga. Con frecuencia, causan dolor o ardor al SlavaRoxanne. Las UTI son causadas por bacterias y pueden curarse con antibióticos. Asegúrese de completar el tratamiento para que la infección desaparezca. La atención de seguimiento es jonatan parte clave de zhao tratamiento y seguridad. Asegúrese de hacer y acudir a todas las citas, y llame a zhao médico si está teniendo problemas. También es jonatan buena idea saber los resultados de los exámenes y mantener jonatan lista de los medicamentos que phi. ¿Cómo puede cuidarse en el hogar? · [address] No deje de tomarlos por el hecho de sentirse mejor. Debe yves todos los antibióticos hasta terminarlos. · Julianna los próximos neil o [address], marcy mayor cantidad de [place] y otros líquidos. [place] puede ayudar a eliminar las bacterias que provocan la infección. (Si tiene jonatan enfermedad de los riñones, el corazón o el hígado y tiene que [place] líquidos, hable con zhao médico antes de aumentar zhao consumo). · Evite las bebidas gaseosas o con cafeína. Pueden irritar la vejiga. · Orine con frecuencia. Trate de vaciar la vejiga cada vez que orine. · Para aliviar el dolor, tome un baño caliente o colóquese jonatan almohadilla térmica a baja temperatura sobre la parte baja del abdomen o la jason genital. Nunca se duerma mientras [place] jonatan almohadilla térmica. Para prevenir las infecciones urinarias  · Marcy abundante agua todos los días. [place] la ayuda a orinar con frecuencia, lo que elimina las bacterias de zhao organismo.  (Si tiene [employer] riñones, el corazón o el hígado y tiene que Ani's líquidos, hable con zhao médico antes de aumentar zhao consumo). · Orine cuando necesite hacerlo. · Orine inmediatamente después de ceasar tenido [employer]. · Cámbiese las toallas sanitarias con frecuencia. · Evite el uso de lavados vaginales, los malia de burbujas, los [place]ter[place] de higiene femenina y otros productos para la higiene femenina que contengan desodorantes. · Después de ir al baño, límpiese de adelante hacia atrás. ¿Cuándo debes pedir ayuda? Llama a tu médico ahora mismo o busca atención médica inmediata si:  ? · Aparecen síntomas jerry fiebre, escalofríos, náuseas o vómitos por Donneta Presser, o empeoran. ? · Te empieza a doler la espalda, steffi debajo de la caja torácica. A esto se le llama dolor en el flanco.   ? · Aparece batsheva o pus en la orina. ? · Tienes problemas con los antibióticos. ?Presta especial atención a los cambios en tu kennedi y asegúrate de comunicarte con tu médico si:  ? · No mejoras después de ceasar tomado un antibiótico radha 2 días. ? · Los síntomas desaparecen y Renelle Gum. ¿Dónde puede encontrar más información en inglés? Lida mcduffie [email or web address] Daquan Gonzalez [id number] en la búsqueda para aprender más acerca de \"Infección urinaria en las mujeres: Instrucciones de cuidado - [ Urinary Tract Infection in Women: Care Instructions ]. \"  Revisado: [address] [place], 2017  Versión del contenido: 11.4  © 2783-7756 Healthwise, Incorporated. Las instrucciones de cuidado fueron adaptadas bajo licencia por [employer] (which disclaims liability or warranty for this information). Si usted tiene [place] afección médica o sobre estas instrucciones, siempre pregunte a zhao profesional de kennedi. [facility] niega toda garantía o responsabilidad por zhao uso de esta información. Cefalexina (Por la boca)   Trata infecciones.  Pertenece a la clase de fármacos llamados antibióticos con cefalosporina. Dat(s) : Daxbia, Keflex   Existen muchas otras marcas de Dueñas. Lacey medicamento no debe ser usado cuando:   Lacey medicamento no es adecuado para todas las personas. No use lacey medicamento si alguna vez ha tenido jonatan reacción alérgica a la cefalexina o a otros medicamentos de cefalosporina. Leighton Rich de usar lacey medicamento:   Alphonsus Lee Ann, Tableta para Suspensión, Líquido  · Zhao médico le indicara cuanto medicamento necesita usar. No use más medicamento de lo indicado. · Shikha y siga las instrucciones para el paciente que vienen con el medicamento. Hable con zhao médico o farmacéutico si tiene alguna pregunta. · Lacey medicamento se puede yves con alimentos o con leche para evitar el malestar estomacal.  · Solución oral: Antes de usarlo, agite ganesh el medicamento. Mida el líquido oral con Rolena Harsh, [place] para uso oral o taza especialmente marcadas para medir medicamentos. · [place] todo zhao medicamento recetado para eliminar zhao infección por completo aunque usted se sienta mejor después de las primeras dosis. · Si olvida jonatan dosis: Si olvida jonatan dosis de zhao medicamento, tómelo lo más pronto posible. Si es chelle la hora para zhao próxima dosis, espere hasta entonces para yves zhao dosis regular. No use medicamento adicional para reponer la dosis olvidada. · [place] o tableta: Guarde a temperatura ambiente, lejos del calor, la humedad y la bree directa. · Solución oral: Guarde en el refrigerador por 14 días. Deseche cualquier medicamento no usado después de 14 días. No lo congele. Medicamentos y [employer] que debe evitar:   Consulte con zhao médico o farmacéutico antes de usar cualquier medicamento, incluyendo los que compra sin receta médica, las vitaminas y los productos herbales. · Algunos medicamentos y alimentos pueden afectar el funcionamiento de cefalexina.  Infórmele a zhao médico si usted [employer] Mario  ¨ Metformina  ¨ Probenecid  Precauciones radha el uso de lacey medicamento:   · Infórmele a zhao médico si usted está embarazada o dando de lactar, o si tiene enfermedad renal o hepática, o antecedentes de problemas digestivos, jerry colitis. Infórmele a zhao médico si usted es alérgico a la penicilina. · Lacey medicamento puede causar diarrea. Llame a zhao médico si la diarrea se intensifica, no se detiene, o contiene batsheva. No tome ningún medicamento para suspender la diarrea hasta que hable con zhao médico. La diarrea puede ocurrir 2 meces o más después de suspender el uso de [place]. · Dígale a todo médico o dentista encargado de atenderle que usted está usando Dueñas. Puede que lacey medicamento afecte algunos resultados de [employer]. · Llame a zhao médico si delia síntomas no mejoran, o si Lindajean Estrin. · Guarde todos los medicamentos fuera del alcance de los niños. Nunca comparta delia medicamentos con [employer]. Efectos secundarios que pueden presentarse radha el uso de lacey medicamento:   Consulte inmediatamente con el médico si nota cualquiera de estos efectos secundarios:  · Reacción alérgica: Comezón o ronchas, hinchazón del amanda o las apollo, hinchazón u hormigueo en la boca o garganta, opresión en el pecho, dificultad para respirar  · Ampollas, despellejamiento, sarpullido freeman en la piel. · Diarrea intensa, especialmente si contiene batsheva o si no se suspenda  · Dolor estomacal severo, vómitos  Consulte con el médico si nota los siguientes efectos secundarios menos graves:   · Diarrea leve o náuseas  Consulte con el médico si nota otros efectos secundarios que janice son causados por lacey medicamento. Llame a zhao médico para consultarle [facility]. Usted puede notificar delia efectos secundarios al FDA al [phone]. © 2017 [facility] Information is for End User's use only and may not be sold, redistributed or otherwise used for commercial purposes. Esta información es sólo para uso en educación. Zhao intención no es darle un consejo médico sobre enfermedades o tratamientos. Colsulte con zhao Hilaria Side farmacéutico antes de seguir cualquier régimen médico para saber si es seguro y efectivo para usted.

## 2018-05-27 NOTE — ED PROVIDER NOTES
HPI Comments: Patient is a 33 yo female with fever and fatigue s/p cholecystectomy. States since procedure (wednesday morning 4 days ago), she has had fever as high as 100.5. NO abdominal pain, however states she has had nausea and vomiting and some headache with the fever however no neck pain or stiffness. Overall patient well appearing, NAD at this time. States she has taken phenergan for nausea otherwise no further medications. Patient is a 34 y.o. female presenting with bleeding. The history is provided by the patient. The history is limited by a language barrier. A  was used. Post-Op Problem   Pertinent negatives include no chest pain, no abdominal pain and no shortness of breath. History reviewed. No pertinent past medical history. History reviewed. No pertinent surgical history. History reviewed. No pertinent family history. Social History     Social History    Marital status:      Spouse name: N/A    Number of children: N/A    Years of education: N/A     Occupational History    Not on file. Social History Main Topics    Smoking status: Never Smoker    Smokeless tobacco: Never Used    Alcohol use No    Drug use: No    Sexual activity: Not on file     Other Topics Concern    Not on file     Social History Narrative         ALLERGIES: Review of patient's allergies indicates no known allergies. Review of Systems   Constitutional: Positive for fatigue and fever. Negative for chills. HENT: Negative for rhinorrhea and sore throat. Eyes: Negative for visual disturbance. Respiratory: Negative for cough and shortness of breath. Cardiovascular: Negative for chest pain and leg swelling. Gastrointestinal: Negative for abdominal pain, diarrhea, nausea and vomiting. Genitourinary: Negative for dysuria. Musculoskeletal: Negative for back pain and neck pain. Skin: Negative for rash. Neurological: Negative for weakness. Psychiatric/Behavioral: The patient is not nervous/anxious. Vitals:    05/26/18 1923   BP: 106/69   Pulse: 83   Resp: 17   Temp: 99.5 °F (37.5 °C)   SpO2: 96%   Weight: 67.6 kg (149 lb)   Height: 5' 3\" (1.6 m)            Physical Exam   Constitutional: She is oriented to person, place, and time. She appears well-developed and well-nourished. HENT:   Head: Normocephalic. Right Ear: External ear normal.   Left Ear: External ear normal.   Eyes: Conjunctivae and EOM are normal. Pupils are equal, round, and reactive to light. Neck: Normal range of motion. Neck supple. No tracheal deviation present. Cardiovascular: Normal rate, regular rhythm, normal heart sounds and intact distal pulses. No murmur heard. Pulmonary/Chest: Effort normal and breath sounds normal. No respiratory distress. Abdominal: Soft. She exhibits no distension. There is no tenderness. There is no rebound. Non-tender to deep palpation through-out entire abdomen. Musculoskeletal: Normal range of motion. Neurological: She is alert and oriented to person, place, and time. No cranial nerve deficit. Skin: No rash noted. Nursing note and vitals reviewed.        MDM  Number of Diagnoses or Management Options     Amount and/or Complexity of Data Reviewed  Clinical lab tests: ordered and reviewed  Tests in the radiology section of CPT®: ordered and reviewed  Tests in the medicine section of CPT®: ordered and reviewed  Review and summarize past medical records: yes    Risk of Complications, Morbidity, and/or Mortality  Presenting problems: high  Diagnostic procedures: high  Management options: high    Patient Progress  Patient progress: stable        ED Course       Procedures    Recent Results (from the past 12 hour(s))   CBC WITH AUTOMATED DIFF    Collection Time: 05/26/18  8:30 PM   Result Value Ref Range    WBC 11.9 (H) 4.3 - 11.1 K/uL    RBC 4.23 4.05 - 5.25 M/uL    HGB 13.1 11.7 - 15.4 g/dL    HCT 36.7 35.8 - 46.3 %    MCV 86.8 79.6 - 97.8 FL    MCH 31.0 26.1 - 32.9 PG    MCHC 35.7 (H) 31.4 - 35.0 g/dL    RDW 13.8 11.9 - 14.6 %    PLATELET 757 783 - 216 K/uL    MPV 10.4 (L) 10.8 - 14.1 FL    NEUTROPHILS 58 47 - 75 %    BAND NEUTROPHILS 11 (H) 0 - 10 %    LYMPHOCYTES 22 16 - 44 %    MONOCYTES 5 3 - 9 %    METAMYELOCYTES 4 %    ABS. NEUTROPHILS 8.7 (H) 1.7 - 8.2 K/UL    ABS. LYMPHOCYTES 2.6 0.5 - 4.6 K/UL    ABS. MONOCYTES 0.6 0.1 - 1.3 K/UL    RBC COMMENTS NORMOCYTIC/NORMOCHROMIC      PLATELET COMMENTS ADEQUATE      DF MANUAL     METABOLIC PANEL, COMPREHENSIVE    Collection Time: 05/26/18  8:30 PM   Result Value Ref Range    Sodium 139 136 - 145 mmol/L    Potassium 3.1 (L) 3.5 - 5.1 mmol/L    Chloride 101 98 - 107 mmol/L    CO2 28 21 - 32 mmol/L    Anion gap 10 7 - 16 mmol/L    Glucose 98 65 - 100 mg/dL    BUN 16 6 - 23 MG/DL    Creatinine 0.73 0.6 - 1.0 MG/DL    GFR est AA >60 >60 ml/min/1.73m2    GFR est non-AA >60 >60 ml/min/1.73m2    Calcium 8.1 (L) 8.3 - 10.4 MG/DL    Bilirubin, total 1.2 (H) 0.2 - 1.1 MG/DL    ALT (SGPT) 39 12 - 65 U/L    AST (SGOT) 26 15 - 37 U/L    Alk. phosphatase 201 (H) 50 - 136 U/L    Protein, total 7.4 6.3 - 8.2 g/dL    Albumin 2.3 (L) 3.5 - 5.0 g/dL    Globulin 5.1 (H) 2.3 - 3.5 g/dL    A-G Ratio 0.5 (L) 1.2 - 3.5     HCG URINE, QL. - POC    Collection Time: 05/26/18  8:47 PM   Result Value Ref Range    Pregnancy test,urine (POC) NEGATIVE  NEG     URINE MICROSCOPIC    Collection Time: 05/26/18  8:48 PM   Result Value Ref Range    WBC 20-50 0 /hpf    RBC 0-3 0 /hpf    Epithelial cells 0-3 0 /hpf    Bacteria 4+ (H) 0 /hpf    Casts 3-5 0 /lpf     Xr Abd Acute W 1 V Chest    Result Date: 5/26/2018  Chest and Abdomen 3 Views dated 5/26/2018 Clinical Information: Postop fever One view of the chest shows  heart to be normal in size and mediastinum unremarkable. Lungs are clear and pulmonary vascularity unremarkable. No pleural effusion. No free air under the diaphragm.  Two views of the abdomen show  a nonspecific bowel gas pattern and no abnormal calcification. Metallic clips are present right upper quadrant. There are also skin staples right upper quadrant and right lower quadrant. Impression: No Acute Abnormality         35 yo female with post op fever:    Patient very well appearing, WBC improved from prior on 05/22/18 prior to surgery, NAD, tolerates oral intake, given rocephin in ED and will discharge home on keflex. Urine culture sent. Abdomen very soft, non-tender and surgical scars appear well healing. Discussed with Dr. Kev Ferrell who is in agreement with plan and discussed with  to patient and  for follow up with PCP in 1-2 days for recheck or immediate return with any return of fever, any nausea or vomiting or inability to tolerate oral intake, any abdominal pain or any further concerns.

## 2018-05-28 LAB
BACTERIA SPEC CULT: ABNORMAL
SERVICE CMNT-IMP: ABNORMAL

## 2018-05-28 NOTE — PROGRESS NOTES
Patient was placed on Keflex at home, given Rocephin in the ED and the urine is susceptible. Continue current medication.

## (undated) DEVICE — TROCARS: Brand: KII® BLUNT TIP ACCESS SYSTEM

## (undated) DEVICE — STANDARD HYPODERMIC NEEDLE,POLYPROPYLENE HUB: Brand: MONOJECT

## (undated) DEVICE — TROCAR: Brand: KII® SLEEVE

## (undated) DEVICE — (D)PREP SKN CHLRAPRP APPL 26ML -- CONVERT TO ITEM 371833

## (undated) DEVICE — LAP CHOLE: Brand: MEDLINE INDUSTRIES, INC.

## (undated) DEVICE — SUTURE SZ 0 27IN 5/8 CIR UR-6  TAPER PT VIOLET ABSRB VICRYL J603H

## (undated) DEVICE — APPLICATOR COT TIP 6IN WOOD --

## (undated) DEVICE — APPLICATOR BNDG 1MM ADH PREMIERPRO EXOFIN

## (undated) DEVICE — SOL ANTI-FOG 6ML MEDC -- MEDICHOICE - CONVERT TO 358427

## (undated) DEVICE — [HIGH FLOW INSUFFLATOR,  DO NOT USE IF PACKAGE IS DAMAGED,  KEEP DRY,  KEEP AWAY FROM SUNLIGHT,  PROTECT FROM HEAT AND RADIOACTIVE SOURCES.]: Brand: PNEUMOSURE

## (undated) DEVICE — TROCAR: Brand: KII FIOS FIRST ENTRY

## (undated) DEVICE — SUTURE MCRYL SZ 4-0 L27IN ABSRB UD L19MM PS-2 1/2 CIR PRIM Y426H

## (undated) DEVICE — CONTAINER SPEC FRMLN 120ML --

## (undated) DEVICE — CLIP APPLIER WITH CLIP LOGIC TECHNOLOGY: Brand: ENDO CLIP III

## (undated) DEVICE — SOLUTION IV 1000ML 0.9% SOD CHL

## (undated) DEVICE — REM POLYHESIVE ADULT PATIENT RETURN ELECTRODE: Brand: VALLEYLAB

## (undated) DEVICE — LOGICUT SCISSOR LENGTH 320MM: Brand: LOGI - LAPAROSCOPIC INSTRUMENT SYSTEM